# Patient Record
Sex: FEMALE | Race: WHITE
[De-identification: names, ages, dates, MRNs, and addresses within clinical notes are randomized per-mention and may not be internally consistent; named-entity substitution may affect disease eponyms.]

---

## 2018-07-17 ENCOUNTER — HOSPITAL ENCOUNTER (OUTPATIENT)
Dept: HOSPITAL 8 - CARD | Age: 38
Discharge: HOME | End: 2018-07-17
Attending: NURSE PRACTITIONER
Payer: MEDICAID

## 2018-07-17 DIAGNOSIS — G43.711: Primary | ICD-10-CM

## 2018-07-17 DIAGNOSIS — R94.01: ICD-10-CM

## 2018-07-17 PROCEDURE — 95816 EEG AWAKE AND DROWSY: CPT

## 2018-07-17 PROCEDURE — 95819 EEG AWAKE AND ASLEEP: CPT

## 2018-07-31 ENCOUNTER — HOSPITAL ENCOUNTER (OUTPATIENT)
Dept: HOSPITAL 8 - ED | Age: 38
Setting detail: OBSERVATION
LOS: 1 days | Discharge: PSYCHIATRIC OR SUBSTANCE USE FACILITY | End: 2018-08-01
Attending: HOSPITALIST | Admitting: HOSPITALIST
Payer: MEDICAID

## 2018-07-31 VITALS — HEIGHT: 70 IN | BODY MASS INDEX: 27.33 KG/M2 | WEIGHT: 190.92 LBS

## 2018-07-31 VITALS — SYSTOLIC BLOOD PRESSURE: 118 MMHG | DIASTOLIC BLOOD PRESSURE: 78 MMHG

## 2018-07-31 DIAGNOSIS — F06.4: ICD-10-CM

## 2018-07-31 DIAGNOSIS — F32.9: ICD-10-CM

## 2018-07-31 DIAGNOSIS — F17.200: ICD-10-CM

## 2018-07-31 DIAGNOSIS — F41.1: ICD-10-CM

## 2018-07-31 DIAGNOSIS — R45.851: Primary | ICD-10-CM

## 2018-07-31 DIAGNOSIS — G43.909: ICD-10-CM

## 2018-07-31 DIAGNOSIS — I67.1: ICD-10-CM

## 2018-07-31 DIAGNOSIS — G40.909: ICD-10-CM

## 2018-07-31 DIAGNOSIS — Z81.8: ICD-10-CM

## 2018-07-31 LAB
ALBUMIN SERPL-MCNC: 3.7 G/DL (ref 3.4–5)
ALP SERPL-CCNC: 62 U/L (ref 45–117)
ALT SERPL-CCNC: 29 U/L (ref 12–78)
ANION GAP SERPL CALC-SCNC: 9 MMOL/L (ref 5–15)
APAP SERPL-MCNC: < 2 MCG/ML (ref 10–30)
BASOPHILS # BLD AUTO: 0.05 X10^3/UL (ref 0–0.1)
BASOPHILS NFR BLD AUTO: 1 % (ref 0–1)
BILIRUB SERPL-MCNC: 0.3 MG/DL (ref 0.2–1)
CALCIUM SERPL-MCNC: 8.8 MG/DL (ref 8.5–10.1)
CHLORIDE SERPL-SCNC: 105 MMOL/L (ref 98–107)
CREAT SERPL-MCNC: 0.78 MG/DL (ref 0.55–1.02)
CULTURE INDICATED?: YES
EOSINOPHIL # BLD AUTO: 0.22 X10^3/UL (ref 0–0.4)
EOSINOPHIL NFR BLD AUTO: 3 % (ref 1–7)
ERYTHROCYTE [DISTWIDTH] IN BLOOD BY AUTOMATED COUNT: 13.4 % (ref 9.6–15.2)
LYMPHOCYTES # BLD AUTO: 2.26 X10^3/UL (ref 1–3.4)
LYMPHOCYTES NFR BLD AUTO: 26 % (ref 22–44)
MCH RBC QN AUTO: 30.2 PG (ref 27–34.8)
MCHC RBC AUTO-ENTMCNC: 34.6 G/DL (ref 32.4–35.8)
MCV RBC AUTO: 87.1 FL (ref 80–100)
MD: NO
MICROSCOPIC: (no result)
MONOCYTES # BLD AUTO: 0.65 X10^3/UL (ref 0.2–0.8)
MONOCYTES NFR BLD AUTO: 8 % (ref 2–9)
NEUTROPHILS # BLD AUTO: 5.45 X10^3/UL (ref 1.8–6.8)
NEUTROPHILS NFR BLD AUTO: 63 % (ref 42–75)
PLATELET # BLD AUTO: 276 X10^3/UL (ref 130–400)
PMV BLD AUTO: 10 FL (ref 7.4–10.4)
PROT SERPL-MCNC: 7.9 G/DL (ref 6.4–8.2)
RBC # BLD AUTO: 5.37 X10^6/UL (ref 3.82–5.3)
VANCOMYCIN TROUGH SERPL-MCNC: 2.2 MG/DL (ref 2.8–20)

## 2018-07-31 PROCEDURE — 84703 CHORIONIC GONADOTROPIN ASSAY: CPT

## 2018-07-31 PROCEDURE — 80053 COMPREHEN METABOLIC PANEL: CPT

## 2018-07-31 PROCEDURE — 85025 COMPLETE CBC W/AUTO DIFF WBC: CPT

## 2018-07-31 PROCEDURE — 80329 ANALGESICS NON-OPIOID 1 OR 2: CPT

## 2018-07-31 PROCEDURE — 99285 EMERGENCY DEPT VISIT HI MDM: CPT

## 2018-07-31 PROCEDURE — G0480 DRUG TEST DEF 1-7 CLASSES: HCPCS

## 2018-07-31 PROCEDURE — 80164 ASSAY DIPROPYLACETIC ACD TOT: CPT

## 2018-07-31 PROCEDURE — 80307 DRUG TEST PRSMV CHEM ANLYZR: CPT

## 2018-07-31 PROCEDURE — 87086 URINE CULTURE/COLONY COUNT: CPT

## 2018-07-31 PROCEDURE — 81001 URINALYSIS AUTO W/SCOPE: CPT

## 2018-07-31 PROCEDURE — 36415 COLL VENOUS BLD VENIPUNCTURE: CPT

## 2018-07-31 PROCEDURE — 93005 ELECTROCARDIOGRAM TRACING: CPT

## 2018-07-31 PROCEDURE — G0378 HOSPITAL OBSERVATION PER HR: HCPCS

## 2018-07-31 RX ADMIN — DULOXETINE HYDROCHLORIDE SCH MG: 20 CAPSULE, DELAYED RELEASE ORAL at 22:19

## 2018-07-31 RX ADMIN — PROPRANOLOL HYDROCHLORIDE SCH MG: 20 TABLET ORAL at 22:18

## 2018-08-01 VITALS — SYSTOLIC BLOOD PRESSURE: 101 MMHG | DIASTOLIC BLOOD PRESSURE: 72 MMHG

## 2018-08-01 RX ADMIN — DULOXETINE HYDROCHLORIDE SCH MG: 20 CAPSULE, DELAYED RELEASE ORAL at 08:12

## 2018-08-01 RX ADMIN — PROPRANOLOL HYDROCHLORIDE SCH MG: 20 TABLET ORAL at 08:09

## 2018-09-06 ENCOUNTER — HOSPITAL ENCOUNTER (EMERGENCY)
Dept: HOSPITAL 8 - ED | Age: 38
Discharge: HOME | End: 2018-09-06
Payer: MEDICAID

## 2018-09-06 VITALS — DIASTOLIC BLOOD PRESSURE: 52 MMHG | SYSTOLIC BLOOD PRESSURE: 96 MMHG

## 2018-09-06 VITALS — HEIGHT: 70 IN | WEIGHT: 189.4 LBS | BODY MASS INDEX: 27.11 KG/M2

## 2018-09-06 DIAGNOSIS — G43.909: ICD-10-CM

## 2018-09-06 DIAGNOSIS — R10.31: ICD-10-CM

## 2018-09-06 DIAGNOSIS — R10.11: Primary | ICD-10-CM

## 2018-09-06 LAB
ALBUMIN SERPL-MCNC: 3.3 G/DL (ref 3.4–5)
ALP SERPL-CCNC: 46 U/L (ref 45–117)
ALT SERPL-CCNC: 19 U/L (ref 12–78)
ANION GAP SERPL CALC-SCNC: 5 MMOL/L (ref 5–15)
APTT BLD: 31 SECONDS (ref 25–31)
BASOPHILS # BLD AUTO: 0.07 X10^3/UL (ref 0–0.1)
BASOPHILS NFR BLD AUTO: 1 % (ref 0–1)
BILIRUB SERPL-MCNC: 0.3 MG/DL (ref 0.2–1)
CALCIUM SERPL-MCNC: 8.6 MG/DL (ref 8.5–10.1)
CHLORIDE SERPL-SCNC: 106 MMOL/L (ref 98–107)
CREAT SERPL-MCNC: 0.73 MG/DL (ref 0.55–1.02)
CULTURE INDICATED?: YES
EOSINOPHIL # BLD AUTO: 0.22 X10^3/UL (ref 0–0.4)
EOSINOPHIL NFR BLD AUTO: 3 % (ref 1–7)
ERYTHROCYTE [DISTWIDTH] IN BLOOD BY AUTOMATED COUNT: 13 % (ref 9.6–15.2)
INR PPP: 1.03 (ref 0.93–1.1)
LYMPHOCYTES # BLD AUTO: 2.54 X10^3/UL (ref 1–3.4)
LYMPHOCYTES NFR BLD AUTO: 34 % (ref 22–44)
MCH RBC QN AUTO: 30.1 PG (ref 27–34.8)
MCHC RBC AUTO-ENTMCNC: 34.4 G/DL (ref 32.4–35.8)
MCV RBC AUTO: 87.6 FL (ref 80–100)
MD: NO
MICROSCOPIC: (no result)
MONOCYTES # BLD AUTO: 0.83 X10^3/UL (ref 0.2–0.8)
MONOCYTES NFR BLD AUTO: 11 % (ref 2–9)
NEUTROPHILS # BLD AUTO: 3.8 X10^3/UL (ref 1.8–6.8)
NEUTROPHILS NFR BLD AUTO: 51 % (ref 42–75)
PLATELET # BLD AUTO: 245 X10^3/UL (ref 130–400)
PMV BLD AUTO: 9.3 FL (ref 7.4–10.4)
PROT SERPL-MCNC: 6.8 G/DL (ref 6.4–8.2)
PROTHROMBIN TIME: 10.7 SECONDS (ref 9.6–11.5)
RBC # BLD AUTO: 4.5 X10^6/UL (ref 3.82–5.3)

## 2018-09-06 PROCEDURE — 96374 THER/PROPH/DIAG INJ IV PUSH: CPT

## 2018-09-06 PROCEDURE — 74177 CT ABD & PELVIS W/CONTRAST: CPT

## 2018-09-06 PROCEDURE — 84703 CHORIONIC GONADOTROPIN ASSAY: CPT

## 2018-09-06 PROCEDURE — 85025 COMPLETE CBC W/AUTO DIFF WBC: CPT

## 2018-09-06 PROCEDURE — 81001 URINALYSIS AUTO W/SCOPE: CPT

## 2018-09-06 PROCEDURE — 80053 COMPREHEN METABOLIC PANEL: CPT

## 2018-09-06 PROCEDURE — 99285 EMERGENCY DEPT VISIT HI MDM: CPT

## 2018-09-06 PROCEDURE — 87086 URINE CULTURE/COLONY COUNT: CPT

## 2018-09-06 PROCEDURE — 85730 THROMBOPLASTIN TIME PARTIAL: CPT

## 2018-09-06 PROCEDURE — 36415 COLL VENOUS BLD VENIPUNCTURE: CPT

## 2018-09-06 PROCEDURE — 85610 PROTHROMBIN TIME: CPT

## 2018-09-06 PROCEDURE — 83690 ASSAY OF LIPASE: CPT

## 2019-06-28 ENCOUNTER — HOSPITAL ENCOUNTER (EMERGENCY)
Dept: HOSPITAL 8 - ED | Age: 39
Discharge: HOME | End: 2019-06-28
Payer: MEDICAID

## 2019-06-28 VITALS — WEIGHT: 247.8 LBS | BODY MASS INDEX: 34.69 KG/M2 | HEIGHT: 71 IN

## 2019-06-28 VITALS — SYSTOLIC BLOOD PRESSURE: 129 MMHG | DIASTOLIC BLOOD PRESSURE: 81 MMHG

## 2019-06-28 DIAGNOSIS — L50.9: Primary | ICD-10-CM

## 2019-06-28 PROCEDURE — 99283 EMERGENCY DEPT VISIT LOW MDM: CPT

## 2019-07-04 ENCOUNTER — HOSPITAL ENCOUNTER (EMERGENCY)
Dept: HOSPITAL 8 - ED | Age: 39
Discharge: HOME | End: 2019-07-04
Payer: MEDICAID

## 2019-07-04 VITALS — WEIGHT: 242.51 LBS | BODY MASS INDEX: 33.95 KG/M2 | HEIGHT: 71 IN

## 2019-07-04 VITALS — DIASTOLIC BLOOD PRESSURE: 107 MMHG | SYSTOLIC BLOOD PRESSURE: 178 MMHG

## 2019-07-04 DIAGNOSIS — F17.200: ICD-10-CM

## 2019-07-04 DIAGNOSIS — R42: Primary | ICD-10-CM

## 2019-07-04 DIAGNOSIS — R11.2: ICD-10-CM

## 2019-07-04 LAB
ALBUMIN SERPL-MCNC: 3.3 G/DL (ref 3.4–5)
ANION GAP SERPL CALC-SCNC: 9 MMOL/L (ref 5–15)
BASOPHILS # BLD AUTO: 0.02 X10^3/UL (ref 0–0.1)
BASOPHILS NFR BLD AUTO: 0 % (ref 0–1)
CALCIUM SERPL-MCNC: 8.6 MG/DL (ref 8.5–10.1)
CHLORIDE SERPL-SCNC: 103 MMOL/L (ref 98–107)
CREAT SERPL-MCNC: 1.01 MG/DL (ref 0.55–1.02)
EOSINOPHIL # BLD AUTO: 0.19 X10^3/UL (ref 0–0.4)
EOSINOPHIL NFR BLD AUTO: 2 % (ref 1–7)
ERYTHROCYTE [DISTWIDTH] IN BLOOD BY AUTOMATED COUNT: 14.3 % (ref 9.6–15.2)
LYMPHOCYTES # BLD AUTO: 0.8 X10^3/UL (ref 1–3.4)
LYMPHOCYTES NFR BLD AUTO: 7 % (ref 22–44)
MCH RBC QN AUTO: 29.3 PG (ref 27–34.8)
MCHC RBC AUTO-ENTMCNC: 33.4 G/DL (ref 32.4–35.8)
MCV RBC AUTO: 87.8 FL (ref 80–100)
MD: (no result)
MONOCYTES # BLD AUTO: 0.68 X10^3/UL (ref 0.2–0.8)
MONOCYTES NFR BLD AUTO: 6 % (ref 2–9)
NEUTROPHILS # BLD AUTO: 9.96 X10^3/UL (ref 1.8–6.8)
NEUTROPHILS NFR BLD AUTO: 86 % (ref 42–75)
PLATELET # BLD AUTO: 270 X10^3/UL (ref 130–400)
PMV BLD AUTO: 9.4 FL (ref 7.4–10.4)
RBC # BLD AUTO: 5.08 X10^6/UL (ref 3.82–5.3)

## 2019-07-04 PROCEDURE — 99284 EMERGENCY DEPT VISIT MOD MDM: CPT

## 2019-07-04 PROCEDURE — 80048 BASIC METABOLIC PNL TOTAL CA: CPT

## 2019-07-04 PROCEDURE — 85025 COMPLETE CBC W/AUTO DIFF WBC: CPT

## 2019-07-04 PROCEDURE — 36415 COLL VENOUS BLD VENIPUNCTURE: CPT

## 2019-07-04 PROCEDURE — 93005 ELECTROCARDIOGRAM TRACING: CPT

## 2019-07-04 PROCEDURE — 82040 ASSAY OF SERUM ALBUMIN: CPT

## 2019-07-04 NOTE — NUR
PT GIVEN DC INSTRUCTIONS AND SCRIPT. PT EDUCATED REGARDING DC MEDICATION, PT'S 
AOX4. RESPS EVEN AND UNLABORED. NO ACUTE DISTRESS AT DC. PT AMB TO DC WITH 
STEADY GAIT.

## 2019-07-04 NOTE — NUR
FIRST CONTACT WITH PT. PT STATES "I WAS DRIVING, MY CAR DOESNT HAVE 
AIRCONDITIONING.  HAS BEEN DIZZY FOR 2 HOURS, COMES AND GOES, HASNT BEEN THIS 
BAD.   I CANT KEEP FLUIDS DOWN"  PT C/O N/V/D AS WELL. PT BEGAN SHAKING AND 
CALLING OUT, SLID DOWN CHAIR IN TRIAGE

CURRENTLY DENIES SEIZURE HX

PT'S AOX4. RESPS EVEN AND UNLABORED. ALL MONITORS IN PLACE. CALL LIGHT WITHIN 
REACH.

## 2020-05-28 ENCOUNTER — HOSPITAL ENCOUNTER (EMERGENCY)
Dept: HOSPITAL 8 - ED | Age: 40
Discharge: HOME | End: 2020-05-28
Payer: MEDICAID

## 2020-05-28 VITALS — BODY MASS INDEX: 33.55 KG/M2 | WEIGHT: 239.64 LBS | HEIGHT: 71 IN

## 2020-05-28 VITALS — SYSTOLIC BLOOD PRESSURE: 112 MMHG | DIASTOLIC BLOOD PRESSURE: 69 MMHG

## 2020-05-28 DIAGNOSIS — R06.02: ICD-10-CM

## 2020-05-28 DIAGNOSIS — Z87.891: ICD-10-CM

## 2020-05-28 DIAGNOSIS — K21.9: ICD-10-CM

## 2020-05-28 DIAGNOSIS — R07.89: Primary | ICD-10-CM

## 2020-05-28 DIAGNOSIS — J45.909: ICD-10-CM

## 2020-05-28 DIAGNOSIS — R94.31: ICD-10-CM

## 2020-05-28 LAB
ALBUMIN SERPL-MCNC: 3.5 G/DL (ref 3.4–5)
ANION GAP SERPL CALC-SCNC: 9 MMOL/L (ref 5–15)
BASOPHILS # BLD AUTO: 0.09 X10^3/UL (ref 0–0.1)
BASOPHILS NFR BLD AUTO: 1 % (ref 0–1)
CALCIUM SERPL-MCNC: 8.7 MG/DL (ref 8.5–10.1)
CHLORIDE SERPL-SCNC: 107 MMOL/L (ref 98–107)
CREAT SERPL-MCNC: 0.88 MG/DL (ref 0.55–1.02)
EOSINOPHIL # BLD AUTO: 0.18 X10^3/UL (ref 0–0.4)
EOSINOPHIL NFR BLD AUTO: 3 % (ref 1–7)
ERYTHROCYTE [DISTWIDTH] IN BLOOD BY AUTOMATED COUNT: 13.6 % (ref 9.6–15.2)
LYMPHOCYTES # BLD AUTO: 2.1 X10^3/UL (ref 1–3.4)
LYMPHOCYTES NFR BLD AUTO: 30 % (ref 22–44)
MCH RBC QN AUTO: 29.7 PG (ref 27–34.8)
MCHC RBC AUTO-ENTMCNC: 33.2 G/DL (ref 32.4–35.8)
MCV RBC AUTO: 89.4 FL (ref 80–100)
MD: NO
MONOCYTES # BLD AUTO: 0.8 X10^3/UL (ref 0.2–0.8)
MONOCYTES NFR BLD AUTO: 11 % (ref 2–9)
NEUTROPHILS # BLD AUTO: 3.95 X10^3/UL (ref 1.8–6.8)
NEUTROPHILS NFR BLD AUTO: 56 % (ref 42–75)
PLATELET # BLD AUTO: 287 X10^3/UL (ref 130–400)
PMV BLD AUTO: 9.4 FL (ref 7.4–10.4)
RBC # BLD AUTO: 4.81 X10^6/UL (ref 3.82–5.3)
TROPONIN I SERPL-MCNC: < 0.015 NG/ML (ref 0–0.04)

## 2020-05-28 PROCEDURE — 93005 ELECTROCARDIOGRAM TRACING: CPT

## 2020-05-28 PROCEDURE — 36415 COLL VENOUS BLD VENIPUNCTURE: CPT

## 2020-05-28 PROCEDURE — 85025 COMPLETE CBC W/AUTO DIFF WBC: CPT

## 2020-05-28 PROCEDURE — 82040 ASSAY OF SERUM ALBUMIN: CPT

## 2020-05-28 PROCEDURE — 84484 ASSAY OF TROPONIN QUANT: CPT

## 2020-05-28 PROCEDURE — 71046 X-RAY EXAM CHEST 2 VIEWS: CPT

## 2020-05-28 PROCEDURE — 99285 EMERGENCY DEPT VISIT HI MDM: CPT

## 2020-05-28 PROCEDURE — 80048 BASIC METABOLIC PNL TOTAL CA: CPT

## 2020-05-28 NOTE — NUR
PT PRESENTED TO ED WITH COMPLAINTS OF CHEST PAIN AND SOB. PT STATES HAVE HAD 
HIVES ON HER "LEGS" FOR A WEEK NOW AND HAVE BEEN TAKING BENADRYL AND PEPCID. PT 
STATES CP OCCURS AT NIGHT TIME WHILE LYING DOWN AND "USUALLY GOES AWAY WHEN I 
WAKE UP BUT TODAY IT DIDNT GO AWAY." ERMD AT BEDSIDE EVALUATING PT.

## 2020-05-28 NOTE — NUR
PT DC IN A STABLE CONDITION. DC INSTRUCTIONS WERE DISCUSSED WITH PT. PT 
VERBALIZED UNDERSTANDING. NO FURTHER QUESTIONS OR CONCERNS WERE EXPRESSED AT 
THAT TIME. PT AMBULATED TO DC DESK WITH RN WITH A STEADY GAIT.

## 2020-12-11 ENCOUNTER — HOSPITAL ENCOUNTER (EMERGENCY)
Dept: HOSPITAL 8 - ED | Age: 40
Discharge: LEFT BEFORE DISCHARGE | End: 2020-12-11
Payer: MEDICAID

## 2020-12-11 VITALS — BODY MASS INDEX: 33.36 KG/M2 | WEIGHT: 238.32 LBS | HEIGHT: 71 IN

## 2020-12-11 VITALS — DIASTOLIC BLOOD PRESSURE: 87 MMHG | SYSTOLIC BLOOD PRESSURE: 127 MMHG

## 2020-12-11 DIAGNOSIS — N93.9: Primary | ICD-10-CM

## 2020-12-11 DIAGNOSIS — Z53.21: ICD-10-CM

## 2020-12-11 LAB
ALBUMIN SERPL-MCNC: 3.8 G/DL (ref 3.4–5)
ALP SERPL-CCNC: 83 U/L (ref 45–117)
ALT SERPL-CCNC: 35 U/L (ref 12–78)
ANION GAP SERPL CALC-SCNC: 7 MMOL/L (ref 5–15)
BASOPHILS # BLD AUTO: 0.1 X10^3/UL (ref 0–0.1)
BASOPHILS NFR BLD AUTO: 1 % (ref 0–1)
BILIRUB SERPL-MCNC: 0.5 MG/DL (ref 0.2–1)
CALCIUM SERPL-MCNC: 8.9 MG/DL (ref 8.5–10.1)
CHLORIDE SERPL-SCNC: 105 MMOL/L (ref 98–107)
CREAT SERPL-MCNC: 0.83 MG/DL (ref 0.55–1.02)
EOSINOPHIL # BLD AUTO: 0.1 X10^3/UL (ref 0–0.4)
EOSINOPHIL NFR BLD AUTO: 2 % (ref 1–7)
ERYTHROCYTE [DISTWIDTH] IN BLOOD BY AUTOMATED COUNT: 13.8 % (ref 9.6–15.2)
LYMPHOCYTES # BLD AUTO: 2 X10^3/UL (ref 1–3.4)
LYMPHOCYTES NFR BLD AUTO: 22 % (ref 22–44)
MCH RBC QN AUTO: 29.8 PG (ref 27–34.8)
MCHC RBC AUTO-ENTMCNC: 33.7 G/DL (ref 32.4–35.8)
MD: NO
MONOCYTES # BLD AUTO: 0.8 X10^3/UL (ref 0.2–0.8)
MONOCYTES NFR BLD AUTO: 9 % (ref 2–9)
NEUTROPHILS # BLD AUTO: 5.9 X10^3/UL (ref 1.8–6.8)
NEUTROPHILS NFR BLD AUTO: 66 % (ref 42–75)
PLATELET # BLD AUTO: 354 X10^3/UL (ref 130–400)
PMV BLD AUTO: 9 FL (ref 7.4–10.4)
PROT SERPL-MCNC: 8.4 G/DL (ref 6.4–8.2)
RBC # BLD AUTO: 5.14 X10^6/UL (ref 3.82–5.3)

## 2020-12-11 PROCEDURE — 80053 COMPREHEN METABOLIC PANEL: CPT

## 2020-12-11 PROCEDURE — 85025 COMPLETE CBC W/AUTO DIFF WBC: CPT

## 2020-12-11 PROCEDURE — 36415 COLL VENOUS BLD VENIPUNCTURE: CPT

## 2020-12-11 PROCEDURE — 84702 CHORIONIC GONADOTROPIN TEST: CPT

## 2023-05-01 ENCOUNTER — NON-PROVIDER VISIT (OUTPATIENT)
Dept: OCCUPATIONAL MEDICINE | Facility: CLINIC | Age: 43
End: 2023-05-01

## 2023-05-01 DIAGNOSIS — Z02.1 PRE-EMPLOYMENT HEALTH SCREENING EXAMINATION: ICD-10-CM

## 2023-05-01 DIAGNOSIS — Z02.1 PRE-EMPLOYMENT DRUG SCREENING: Primary | ICD-10-CM

## 2023-05-01 LAB
AMP AMPHETAMINE: NORMAL
COC COCAINE: NORMAL
INT CON NEG: NORMAL
INT CON POS: NORMAL
MET METHAMPHETAMINES: NORMAL
OPI OPIATES: NORMAL
PCP PHENCYCLIDINE: NORMAL
POC DRUG COMMENT 753798-OCCUPATIONAL HEALTH: NEGATIVE
THC: NORMAL

## 2023-05-01 PROCEDURE — 80305 DRUG TEST PRSMV DIR OPT OBS: CPT | Performed by: NURSE PRACTITIONER

## 2023-05-01 PROCEDURE — 86580 TB INTRADERMAL TEST: CPT | Performed by: NURSE PRACTITIONER

## 2023-05-03 ENCOUNTER — NON-PROVIDER VISIT (OUTPATIENT)
Dept: OCCUPATIONAL MEDICINE | Facility: CLINIC | Age: 43
End: 2023-05-03

## 2023-05-03 DIAGNOSIS — Z11.1 ENCOUNTER FOR PPD SKIN TEST READING: ICD-10-CM

## 2023-05-04 LAB — TB WHEAL 3D P 5 TU DIAM: NORMAL MM

## 2024-02-28 PROBLEM — S82.841A BIMALLEOLAR ANKLE FRACTURE, RIGHT, CLOSED, INITIAL ENCOUNTER: Status: ACTIVE | Noted: 2024-02-28

## 2024-02-28 RX ORDER — BUSPIRONE HYDROCHLORIDE 10 MG/1
10 TABLET ORAL 2 TIMES DAILY
COMMUNITY

## 2024-02-28 RX ORDER — METRONIDAZOLE
POWDER (GRAM) MISCELLANEOUS
Status: ON HOLD | COMMUNITY
End: 2024-03-05

## 2024-02-28 RX ORDER — HYDROXYZINE HCL
POWDER (GRAM) MISCELLANEOUS
Status: ON HOLD | COMMUNITY
End: 2024-03-05

## 2024-02-28 NOTE — H&P (VIEW-ONLY)
Subjective   Patient ID:  Yani Moore is a 43 y.o. female.    Chief Complaint:  Fracture of the Right Ankle    Last Surgery: No surgery found on No surgery found    HPI Yani is a pleasant 43-year-old female who had a syncopal episode last night and fell sustaining an injury to her right ankle.  She had a syncope workup at the ER that was negative.  She is having pain medially and laterally around her right ankle.  Denies numbness or tingling.    Review of Systems Works as a caregiver professionally.  Otherwise negative.    Objective   Ortho Exam  Alert and oriented no apparent distress.  Very pleasant conversation.  Chest breathing comfortably, heart regular rate and rhythm.  She has moderate to severe circumferential swelling around her right ankle.  Her skin is intact.  She is neurovascularly intact.    Last Imaging Result(s):   DX-ANKLE 3+ VIEWS RIGHT  New x-rays taken today AP lateral and mortise view of the right ankle show   a distal fibula fracture, medial clear space widening with avulsion of her   deltoid.  Likely syndesmotic disruption.      Assessment & Plan   Encounter Diagnoses:   Bimalleolar ankle fracture, right, closed, initial encounter    Orders Placed This Encounter    Surgical Case Request: ORIF, ANKLE     Yani is a pleasant 43-year-old female with a bimalleolar equivalent right ankle fracture, syndesmotic disruption, deltoid avulsion.  I would like to splint her for soft tissue rest.  She is indicated for surgical stabilization.  She will be weightbearing as tolerated in a boot postoperatively.  I like to get her into physical therapy a week postoperatively.  We discussed the procedure and the expected postoperative course.  I have filled out a scheduling form and look forward to seeing her on her scheduled date.  Return for Post-Op, Imaging.

## 2024-02-29 ENCOUNTER — APPOINTMENT (OUTPATIENT)
Dept: ADMISSIONS | Facility: MEDICAL CENTER | Age: 44
End: 2024-02-29
Attending: ORTHOPAEDIC SURGERY
Payer: MEDICAID

## 2024-03-01 ENCOUNTER — PRE-ADMISSION TESTING (OUTPATIENT)
Dept: ADMISSIONS | Facility: MEDICAL CENTER | Age: 44
End: 2024-03-01
Payer: MEDICAID

## 2024-03-01 RX ORDER — CARIPRAZINE 4.5 MG/1
4.5 CAPSULE, GELATIN COATED ORAL NIGHTLY
COMMUNITY
Start: 2024-02-16

## 2024-03-01 RX ORDER — DIVALPROEX SODIUM 250 MG/1
250 TABLET, DELAYED RELEASE ORAL EVERY MORNING
COMMUNITY
Start: 2024-02-21

## 2024-03-01 RX ORDER — KETOROLAC TROMETHAMINE 10 MG/1
10 TABLET, FILM COATED ORAL EVERY 6 HOURS PRN
COMMUNITY

## 2024-03-01 RX ORDER — HYDROCODONE BITARTRATE AND ACETAMINOPHEN 5; 325 MG/1; MG/1
1 TABLET ORAL EVERY 4 HOURS PRN
COMMUNITY
Start: 2024-02-28

## 2024-03-01 RX ORDER — DIVALPROEX SODIUM 500 MG/1
500 TABLET, DELAYED RELEASE ORAL NIGHTLY
COMMUNITY

## 2024-03-04 ENCOUNTER — ANESTHESIA EVENT (OUTPATIENT)
Dept: SURGERY | Facility: MEDICAL CENTER | Age: 44
End: 2024-03-04
Payer: MEDICAID

## 2024-03-05 ENCOUNTER — HOSPITAL ENCOUNTER (OUTPATIENT)
Facility: MEDICAL CENTER | Age: 44
End: 2024-03-05
Attending: ORTHOPAEDIC SURGERY | Admitting: ORTHOPAEDIC SURGERY
Payer: MEDICAID

## 2024-03-05 ENCOUNTER — APPOINTMENT (OUTPATIENT)
Dept: RADIOLOGY | Facility: MEDICAL CENTER | Age: 44
End: 2024-03-05
Attending: ORTHOPAEDIC SURGERY
Payer: MEDICAID

## 2024-03-05 ENCOUNTER — ANESTHESIA (OUTPATIENT)
Dept: SURGERY | Facility: MEDICAL CENTER | Age: 44
End: 2024-03-05
Payer: MEDICAID

## 2024-03-05 VITALS
TEMPERATURE: 97.4 F | SYSTOLIC BLOOD PRESSURE: 141 MMHG | OXYGEN SATURATION: 94 % | HEART RATE: 74 BPM | RESPIRATION RATE: 18 BRPM | HEIGHT: 70 IN | DIASTOLIC BLOOD PRESSURE: 86 MMHG | WEIGHT: 231.48 LBS | BODY MASS INDEX: 33.14 KG/M2

## 2024-03-05 LAB
ANION GAP SERPL CALC-SCNC: 17 MMOL/L (ref 7–16)
BUN SERPL-MCNC: 12 MG/DL (ref 8–22)
CALCIUM SERPL-MCNC: 9.5 MG/DL (ref 8.5–10.5)
CHLORIDE SERPL-SCNC: 104 MMOL/L (ref 96–112)
CO2 SERPL-SCNC: 17 MMOL/L (ref 20–33)
CREAT SERPL-MCNC: 0.83 MG/DL (ref 0.5–1.4)
ERYTHROCYTE [DISTWIDTH] IN BLOOD BY AUTOMATED COUNT: 41.1 FL (ref 35.9–50)
GFR SERPLBLD CREATININE-BSD FMLA CKD-EPI: 88 ML/MIN/1.73 M 2
GLUCOSE SERPL-MCNC: 107 MG/DL (ref 65–99)
HCG UR QL: NEGATIVE
HCT VFR BLD AUTO: 46.2 % (ref 37–47)
HGB BLD-MCNC: 15.9 G/DL (ref 12–16)
MCH RBC QN AUTO: 28.9 PG (ref 27–33)
MCHC RBC AUTO-ENTMCNC: 34.4 G/DL (ref 32.2–35.5)
MCV RBC AUTO: 84 FL (ref 81.4–97.8)
PLATELET # BLD AUTO: 405 K/UL (ref 164–446)
PMV BLD AUTO: 11.2 FL (ref 9–12.9)
POTASSIUM SERPL-SCNC: 3.6 MMOL/L (ref 3.6–5.5)
RBC # BLD AUTO: 5.5 M/UL (ref 4.2–5.4)
SODIUM SERPL-SCNC: 138 MMOL/L (ref 135–145)
WBC # BLD AUTO: 9 K/UL (ref 4.8–10.8)

## 2024-03-05 PROCEDURE — 160036 HCHG PACU - EA ADDL 30 MINS PHASE I: Performed by: ORTHOPAEDIC SURGERY

## 2024-03-05 PROCEDURE — 160002 HCHG RECOVERY MINUTES (STAT): Performed by: ORTHOPAEDIC SURGERY

## 2024-03-05 PROCEDURE — 85027 COMPLETE CBC AUTOMATED: CPT

## 2024-03-05 PROCEDURE — 80048 BASIC METABOLIC PNL TOTAL CA: CPT

## 2024-03-05 PROCEDURE — 160035 HCHG PACU - 1ST 60 MINS PHASE I: Performed by: ORTHOPAEDIC SURGERY

## 2024-03-05 PROCEDURE — 160048 HCHG OR STATISTICAL LEVEL 1-5: Performed by: ORTHOPAEDIC SURGERY

## 2024-03-05 PROCEDURE — 27814 TREATMENT OF ANKLE FRACTURE: CPT | Mod: ASROC,RT | Performed by: NURSE PRACTITIONER

## 2024-03-05 PROCEDURE — 73610 X-RAY EXAM OF ANKLE: CPT | Mod: RT

## 2024-03-05 PROCEDURE — 160046 HCHG PACU - 1ST 60 MINS PHASE II: Performed by: ORTHOPAEDIC SURGERY

## 2024-03-05 PROCEDURE — 700111 HCHG RX REV CODE 636 W/ 250 OVERRIDE (IP): Mod: UD | Performed by: ORTHOPAEDIC SURGERY

## 2024-03-05 PROCEDURE — 160009 HCHG ANES TIME/MIN: Performed by: ORTHOPAEDIC SURGERY

## 2024-03-05 PROCEDURE — 81025 URINE PREGNANCY TEST: CPT

## 2024-03-05 PROCEDURE — 27814 TREATMENT OF ANKLE FRACTURE: CPT | Mod: RT | Performed by: ORTHOPAEDIC SURGERY

## 2024-03-05 PROCEDURE — 36415 COLL VENOUS BLD VENIPUNCTURE: CPT

## 2024-03-05 PROCEDURE — 160029 HCHG SURGERY MINUTES - 1ST 30 MINS LEVEL 4: Performed by: ORTHOPAEDIC SURGERY

## 2024-03-05 PROCEDURE — 27829 TREAT LOWER LEG JOINT: CPT | Mod: ASROC,RT | Performed by: NURSE PRACTITIONER

## 2024-03-05 PROCEDURE — 64445 NJX AA&/STRD SCIATIC NRV IMG: CPT | Performed by: ORTHOPAEDIC SURGERY

## 2024-03-05 PROCEDURE — 8968 PR NO CHARGE - PROCEDURE: Mod: ASROC,RT | Performed by: NURSE PRACTITIONER

## 2024-03-05 PROCEDURE — 160025 RECOVERY II MINUTES (STATS): Performed by: ORTHOPAEDIC SURGERY

## 2024-03-05 PROCEDURE — 700111 HCHG RX REV CODE 636 W/ 250 OVERRIDE (IP): Mod: UD | Performed by: STUDENT IN AN ORGANIZED HEALTH CARE EDUCATION/TRAINING PROGRAM

## 2024-03-05 PROCEDURE — 160041 HCHG SURGERY MINUTES - EA ADDL 1 MIN LEVEL 4: Performed by: ORTHOPAEDIC SURGERY

## 2024-03-05 PROCEDURE — 27829 TREAT LOWER LEG JOINT: CPT | Mod: RT | Performed by: ORTHOPAEDIC SURGERY

## 2024-03-05 PROCEDURE — 700105 HCHG RX REV CODE 258: Mod: UD | Performed by: ORTHOPAEDIC SURGERY

## 2024-03-05 PROCEDURE — 27696 REPAIR OF ANKLE LIGAMENTS: CPT | Mod: ASROC,RT | Performed by: NURSE PRACTITIONER

## 2024-03-05 PROCEDURE — C1713 ANCHOR/SCREW BN/BN,TIS/BN: HCPCS | Performed by: ORTHOPAEDIC SURGERY

## 2024-03-05 PROCEDURE — 700101 HCHG RX REV CODE 250: Mod: UD | Performed by: STUDENT IN AN ORGANIZED HEALTH CARE EDUCATION/TRAINING PROGRAM

## 2024-03-05 PROCEDURE — 27695 REPAIR OF ANKLE LIGAMENT: CPT | Mod: RT | Performed by: ORTHOPAEDIC SURGERY

## 2024-03-05 PROCEDURE — 27696 REPAIR OF ANKLE LIGAMENTS: CPT | Mod: RT | Performed by: ORTHOPAEDIC SURGERY

## 2024-03-05 DEVICE — SCREW BONE VARIAX T10 FULL THREAD L18 MM OD3.5 MM FOOT ANKLE STARDRIVE NONSTERILE: Type: IMPLANTABLE DEVICE | Site: ANKLE | Status: FUNCTIONAL

## 2024-03-05 DEVICE — PLATE BONE VARIAX TITANIUM 77 MM X 10 MM X 2 MM 16 MM X 1.3 MM FIBULA LATERAL 3 HOLE POLYAXIAL LOCK: Type: IMPLANTABLE DEVICE | Site: ANKLE | Status: FUNCTIONAL

## 2024-03-05 DEVICE — SCREW BONE VARIAX T10 FULL THREAD L12 MM OD3.5 MM FOOT ANKLE LOCK STARDRIVE NONSTERILE: Type: IMPLANTABLE DEVICE | Site: ANKLE | Status: FUNCTIONAL

## 2024-03-05 DEVICE — SCREW BONE VARIAX T10 FULL THREAD L10 MM OD3.5 MM FOOT ANKLE LOCK STARDRIVE NONSTERILE: Type: IMPLANTABLE DEVICE | Site: ANKLE | Status: FUNCTIONAL

## 2024-03-05 DEVICE — SCREW BONE VARIAX T10 FULL THREAD L16 MM OD3.5 MM FOOT ANKLE STARDRIVE NONSTERILE: Type: IMPLANTABLE DEVICE | Site: ANKLE | Status: FUNCTIONAL

## 2024-03-05 DEVICE — DEVICE FIXATION GRAVITY SYNCHFIX SYNDESMOSIS (1EA): Type: IMPLANTABLE DEVICE | Site: ANKLE | Status: FUNCTIONAL

## 2024-03-05 DEVICE — SCREW BONE VARIAX T10 FULL THREAD L14 MM OD3.5 MM FOOT ANKLE STARDRIVE NONSTERILE: Type: IMPLANTABLE DEVICE | Site: ANKLE | Status: FUNCTIONAL

## 2024-03-05 DEVICE — SCREW BONE VARIAX T10 FULL THREAD L12 MM OD3.5 MM FOOT ANKLE STARDRIVE NONSTERILE: Type: IMPLANTABLE DEVICE | Site: ANKLE | Status: FUNCTIONAL

## 2024-03-05 RX ORDER — OXYCODONE HCL 5 MG/5 ML
5 SOLUTION, ORAL ORAL
Status: DISCONTINUED | OUTPATIENT
Start: 2024-03-05 | End: 2024-03-05 | Stop reason: HOSPADM

## 2024-03-05 RX ORDER — DIPHENHYDRAMINE HYDROCHLORIDE 50 MG/ML
12.5 INJECTION INTRAMUSCULAR; INTRAVENOUS
Status: DISCONTINUED | OUTPATIENT
Start: 2024-03-05 | End: 2024-03-05 | Stop reason: HOSPADM

## 2024-03-05 RX ORDER — ACETAMINOPHEN 500 MG
1000 TABLET ORAL ONCE
Status: DISCONTINUED | OUTPATIENT
Start: 2024-03-05 | End: 2024-03-05 | Stop reason: HOSPADM

## 2024-03-05 RX ORDER — DEXAMETHASONE SODIUM PHOSPHATE 4 MG/ML
INJECTION, SOLUTION INTRA-ARTICULAR; INTRALESIONAL; INTRAMUSCULAR; INTRAVENOUS; SOFT TISSUE PRN
Status: DISCONTINUED | OUTPATIENT
Start: 2024-03-05 | End: 2024-03-05 | Stop reason: SURG

## 2024-03-05 RX ORDER — MIDAZOLAM HYDROCHLORIDE 1 MG/ML
INJECTION INTRAMUSCULAR; INTRAVENOUS PRN
Status: DISCONTINUED | OUTPATIENT
Start: 2024-03-05 | End: 2024-03-05 | Stop reason: SURG

## 2024-03-05 RX ORDER — ROPIVACAINE HYDROCHLORIDE 5 MG/ML
INJECTION, SOLUTION EPIDURAL; INFILTRATION; PERINEURAL
Status: COMPLETED | OUTPATIENT
Start: 2024-03-05 | End: 2024-03-05

## 2024-03-05 RX ORDER — LIDOCAINE HYDROCHLORIDE 20 MG/ML
INJECTION, SOLUTION EPIDURAL; INFILTRATION; INTRACAUDAL; PERINEURAL PRN
Status: DISCONTINUED | OUTPATIENT
Start: 2024-03-05 | End: 2024-03-05 | Stop reason: SURG

## 2024-03-05 RX ORDER — ONDANSETRON 2 MG/ML
4 INJECTION INTRAMUSCULAR; INTRAVENOUS
Status: DISCONTINUED | OUTPATIENT
Start: 2024-03-05 | End: 2024-03-05 | Stop reason: HOSPADM

## 2024-03-05 RX ORDER — OXYCODONE HCL 5 MG/5 ML
10 SOLUTION, ORAL ORAL
Status: DISCONTINUED | OUTPATIENT
Start: 2024-03-05 | End: 2024-03-05 | Stop reason: HOSPADM

## 2024-03-05 RX ORDER — CEFAZOLIN SODIUM 1 G/3ML
2 INJECTION, POWDER, FOR SOLUTION INTRAMUSCULAR; INTRAVENOUS ONCE
Status: COMPLETED | OUTPATIENT
Start: 2024-03-05 | End: 2024-03-05

## 2024-03-05 RX ORDER — HYDROMORPHONE HYDROCHLORIDE 1 MG/ML
0.1 INJECTION, SOLUTION INTRAMUSCULAR; INTRAVENOUS; SUBCUTANEOUS
Status: DISCONTINUED | OUTPATIENT
Start: 2024-03-05 | End: 2024-03-05 | Stop reason: HOSPADM

## 2024-03-05 RX ORDER — KETOROLAC TROMETHAMINE 15 MG/ML
INJECTION, SOLUTION INTRAMUSCULAR; INTRAVENOUS PRN
Status: DISCONTINUED | OUTPATIENT
Start: 2024-03-05 | End: 2024-03-05 | Stop reason: SURG

## 2024-03-05 RX ORDER — SODIUM CHLORIDE, SODIUM LACTATE, POTASSIUM CHLORIDE, CALCIUM CHLORIDE 600; 310; 30; 20 MG/100ML; MG/100ML; MG/100ML; MG/100ML
INJECTION, SOLUTION INTRAVENOUS CONTINUOUS
Status: DISCONTINUED | OUTPATIENT
Start: 2024-03-05 | End: 2024-03-05 | Stop reason: HOSPADM

## 2024-03-05 RX ORDER — DEXAMETHASONE SODIUM PHOSPHATE 4 MG/ML
INJECTION, SOLUTION INTRA-ARTICULAR; INTRALESIONAL; INTRAMUSCULAR; INTRAVENOUS; SOFT TISSUE
Status: COMPLETED | OUTPATIENT
Start: 2024-03-05 | End: 2024-03-05

## 2024-03-05 RX ORDER — HYDROMORPHONE HYDROCHLORIDE 1 MG/ML
0.2 INJECTION, SOLUTION INTRAMUSCULAR; INTRAVENOUS; SUBCUTANEOUS
Status: DISCONTINUED | OUTPATIENT
Start: 2024-03-05 | End: 2024-03-05 | Stop reason: HOSPADM

## 2024-03-05 RX ORDER — BUPIVACAINE HYDROCHLORIDE 5 MG/ML
INJECTION, SOLUTION EPIDURAL; INTRACAUDAL
Status: DISCONTINUED | OUTPATIENT
Start: 2024-03-05 | End: 2024-03-05 | Stop reason: HOSPADM

## 2024-03-05 RX ORDER — HYDROMORPHONE HYDROCHLORIDE 1 MG/ML
0.4 INJECTION, SOLUTION INTRAMUSCULAR; INTRAVENOUS; SUBCUTANEOUS
Status: DISCONTINUED | OUTPATIENT
Start: 2024-03-05 | End: 2024-03-05 | Stop reason: HOSPADM

## 2024-03-05 RX ORDER — HALOPERIDOL 5 MG/ML
1 INJECTION INTRAMUSCULAR
Status: DISCONTINUED | OUTPATIENT
Start: 2024-03-05 | End: 2024-03-05 | Stop reason: HOSPADM

## 2024-03-05 RX ORDER — ONDANSETRON 2 MG/ML
INJECTION INTRAMUSCULAR; INTRAVENOUS PRN
Status: DISCONTINUED | OUTPATIENT
Start: 2024-03-05 | End: 2024-03-05 | Stop reason: SURG

## 2024-03-05 RX ORDER — METRONIDAZOLE 500 MG/1
500 TABLET ORAL 3 TIMES DAILY
COMMUNITY

## 2024-03-05 RX ADMIN — ONDANSETRON 4 MG: 2 INJECTION INTRAMUSCULAR; INTRAVENOUS at 08:42

## 2024-03-05 RX ADMIN — DEXAMETHASONE SODIUM PHOSPHATE 1.3 MG: 4 INJECTION, SOLUTION INTRA-ARTICULAR; INTRALESIONAL; INTRAMUSCULAR; INTRAVENOUS; SOFT TISSUE at 07:51

## 2024-03-05 RX ADMIN — MIDAZOLAM HYDROCHLORIDE 2 MG: 1 INJECTION, SOLUTION INTRAMUSCULAR; INTRAVENOUS at 07:50

## 2024-03-05 RX ADMIN — FENTANYL CITRATE 50 MCG: 50 INJECTION, SOLUTION INTRAMUSCULAR; INTRAVENOUS at 08:42

## 2024-03-05 RX ADMIN — KETOROLAC TROMETHAMINE 15 MG: 15 INJECTION, SOLUTION INTRAMUSCULAR; INTRAVENOUS at 08:42

## 2024-03-05 RX ADMIN — LIDOCAINE HYDROCHLORIDE 40 MG: 20 INJECTION, SOLUTION EPIDURAL; INFILTRATION; INTRACAUDAL at 07:56

## 2024-03-05 RX ADMIN — CEFAZOLIN 2 G: 1 INJECTION, POWDER, FOR SOLUTION INTRAMUSCULAR; INTRAVENOUS at 07:58

## 2024-03-05 RX ADMIN — PROPOFOL 200 MG: 10 INJECTION, EMULSION INTRAVENOUS at 07:56

## 2024-03-05 RX ADMIN — SODIUM CHLORIDE, POTASSIUM CHLORIDE, SODIUM LACTATE AND CALCIUM CHLORIDE: 600; 310; 30; 20 INJECTION, SOLUTION INTRAVENOUS at 07:49

## 2024-03-05 RX ADMIN — FENTANYL CITRATE 50 MCG: 50 INJECTION, SOLUTION INTRAMUSCULAR; INTRAVENOUS at 07:55

## 2024-03-05 RX ADMIN — ROPIVACAINE HYDROCHLORIDE 20 ML: 5 INJECTION EPIDURAL; INFILTRATION; PERINEURAL at 07:51

## 2024-03-05 RX ADMIN — DEXAMETHASONE SODIUM PHOSPHATE 6 MG: 4 INJECTION INTRA-ARTICULAR; INTRALESIONAL; INTRAMUSCULAR; INTRAVENOUS; SOFT TISSUE at 07:58

## 2024-03-05 ASSESSMENT — PAIN DESCRIPTION - PAIN TYPE
TYPE: SURGICAL PAIN

## 2024-03-05 NOTE — ANESTHESIA TIME REPORT
Anesthesia Start and Stop Event Times       Date Time Event    3/5/2024 0748 Ready for Procedure     0749 Anesthesia Start          Responsible Staff  03/05/24      Name Role Begin End    Zechariah Narvaez M.D. Anesth 0749           Overtime Reason:  no overtime (within assigned shift)    Comments:

## 2024-03-05 NOTE — ANESTHESIA POSTPROCEDURE EVALUATION
Patient: Yani Moore    Procedure Summary       Date: 03/05/24 Room / Location: [place] [address] / SURGERY [facility]    Anesthesia Start: 0749 Anesthesia Stop: 0850    Procedures:       RIGHT ANKLE BIMALLEOLAR OPEN REDUCTION INTERNAL FIXATION, SYNDESMOTIC (Right: Ankle)      REPAIR, DELTOID LIGAMENT (Right: Ankle) Diagnosis:       Bimalleolar ankle fracture, right, closed, initial encounter      (Bimalleolar ankle fracture, right, closed,)    Surgeons: Mitch Ambriz M.D. Responsible Provider: Zechariah Narvaez M.D.    Anesthesia Type: general, peripheral nerve block ASA Status: 2            Final Anesthesia Type: general, peripheral nerve block  Last vitals  BP   Blood Pressure: (!) 141/86    Temp   36.3 °C (97.4 °F)    Pulse   74   Resp   18    SpO2   94 %      Anesthesia Post Evaluation    Patient location during evaluation: PACU  Patient participation: complete - patient participated  Level of consciousness: awake and alert    Airway patency: patent  Anesthetic complications: no  Cardiovascular status: hemodynamically stable  Respiratory status: acceptable  Hydration status: euvolemic    PONV: none          No notable events documented.     Nurse Pain Score: 0 (NPRS)

## 2024-03-05 NOTE — ANESTHESIA TIME REPORT
Anesthesia Start and Stop Event Times       Date Time Event    3/5/2024 0748 Ready for Procedure     0749 Anesthesia Start     0850 Anesthesia Stop          Responsible Staff  03/05/24      Name Role Begin End    Zechariah Narvaez M.D. Anesth 0749 0850          Overtime Reason:  no overtime (within assigned shift)    Comments:

## 2024-03-05 NOTE — OR NURSING
0848 Pt arrived from OR via gurney. Report given by anesthesia and RN. 98.6. sleeping, 8L mask even non labored breathing. Lungs clear airway patent. SR. Skin pink warm dry. Piv patent. RLE elevated, walking boot, dressing cdi no drainage, toes pink warm brisk cap refill. Glasses in chart    0855, 0913 updated Asha, mother, via text     0916 awake, sitting up drinking water. Clear speech, follows commands. Glasses on pt. Denies pain and nausea at this time.    0931 given personal belongs and cell phone. Eating crackers     0941 playing on personal cell phone    0954 awake alert. RA even non labored breathing. Lungs clear airway patent. DENIES pain and nausea. RLE dressing cdi, toes pink warm brisk cap refill.     1014 report given to Alysia IVAN, rm 32, phase 2 ready for transfer

## 2024-03-05 NOTE — ANESTHESIA PROCEDURE NOTES
Airway    Date/Time: 3/5/2024 7:57 AM    Performed by: Zechariah Narvaez M.D.  Authorized by: Zechariah Narvaez M.D.    Location:  OR  Urgency:  Elective  Indications for Airway Management:  Anesthesia      Spontaneous Ventilation: absent    Sedation Level:  Deep  Preoxygenated: Yes    Final Airway Type:  Supraglottic airway  Final Supraglottic Airway:  Standard LMA    SGA Size:  4  Number of Attempts at Approach:  1

## 2024-03-05 NOTE — OP REPORT
SURGEON:  Mitch Ambriz MD      PREOPERATIVE DIAGNOSIS:    1.  Right bimalleolar ankle fracture  2.  Right syndesmotic disruption  3.  Right deltoid avulsion    POSTOPERATIVE DIAGNOSIS:    Same    PROCEDURES PERFORMED:      1.  Open reduction internal fixation right bimalleolar ankle fracture  2.  Open reduction internal fixation right syndesmosis  3.  Open repair right deltoid    ASSISTANT: GRACIELA Dover    ANESTHESIA:   General with peripheral nerve block requested by me for postop pain control    IMPLANTS: [employer] lateral distal fibula plate with 3.5 millimeter screws; [employer] SynchFix syndesmotic fixation device; Arthrex DX fiber tack    TOURNIQUET TIME: 29 minutes at 250 mmHg    EBL: 10 cc    COMPLICATIONS:  None.    DISPOSITION:  Stable to Post Anesthesia Care Unit.    INDICATIONS: Yani is a pleasant 43-year-old female sustained a syncopal episode and fell and twisted her right ankle.  She was splinted for soft tissue rest given the unstable nature of her injury, she is now amenable to surgical repair.    PROCEDURE IN DETAIL:   Greeted in the preop holding area and identified by name medical record number.  The right lower extremity was marked.  Risks of the procedure including bleeding, infection, pain, malunion, nonunion, neurovascular damage and need for more surgery were discussed and she provided written consent.  She was taken to the operating room and placed on table in supine position.  Preop antibiotics were administered and general anesthesia was induced.  A nonsterile tourniquet was placed on the right thigh and the right lower extremity prepped and draped in the usual sterile fashion.  An operative pause was taken where all present were in agreement with patient identification, laterality and procedure to be performed.  The limb was elevated for 5 minutes and the tourniquet raised to 250 mmHg.    We made a 6 centimeter longitudinal incision along the posterolateral border the  distal 6 centimeters in the distal fibula.  Sharp dissection was carried down to bone.  We mobilized the distal fragment meticulously cleaned it with a combination of sharp dissection and Hernandez tip suction.  We used a point-to-point reduction clamp to hold an anatomic reduction.  I selected a lateral plate given the distal nature of the fracture.  We hand contoured the plate to the lateral aspect of the bone.  We used combination of locking and nonlocking screws to hold an anatomic reduction of the fibula.  On removal of the clamp this was maintained.  We then stressed the ankle and noted persistent instability of both the syndesmosis and the medial clear space.  There is a small avulsion fragment within the deltoid.    We are able to hold an anatomic reduction of the syndesmosis while we drilled for and placed a FanminderFix syndesmotic fixation device through the open hole in the plate, we made a small medial incision to seat the medial plate appropriately.  We were careful not to over tighten.  On tightening the syndesmosis was now stable and anatomically reduced however the medial Clear space remained unstable.    We extended the medial incision distally to a length of about 4 cm.  We curved anteriorly in line with the saphenous vein.  Blunt dissection was carried down to the deltoid which was completely avulsed off of the medial malleolus.  I placed 1-0 Vicryl suture in figure-of-eight fashion attempting to avoid using an anchor however it did not hold appropriately.  We used a rongeur to remove small osteophytes on the medial malleolus and make a landing spot for anchor.  We copiously irrigated the joint.  We placed a fiber tack DX anchor 1 cm from the tip of the medial malleolus.  The limbs of suture were passed through the deep and superficial deltoid and modified Brown-Kayode fashion.  The ankle was held in neutral alignment while they were tied.  On tying them the medial clear space was now  stable to valgus tilt and external rotation.  No evidence of interval complication.  Tourniquet was let down and hemostasis achieved.  Incision copiously irrigated.  Medially as subcutaneous layer was closed with 3-0 Monocryl buried erupted fashion.  Skin closed with 3-0 nylon horizontal mattress fashion.  Laterally the fascia layer was closed over the plate with 3-0 Monocryl in figure-of-eight fashion.  Subcutaneous layer closed with 3-0 Monocryl buried interrupted fashion.  Skin closed with 3-0 nylon horizontal mattress fashion.  Adaptic gauze and compressive wrap were placed.  She was placed in a cam walker boot.  Awakened and extubated in stable condition.    POSTOPERATIVE COURSE: She will discharge home today assuming adequate pain control mobilization.  Weightbearing as tolerated in a cam walker boot.  Like her to start physical therapy in 1 to 2 weeks.  Boot for approximately 6 weeks depending on healing.  I will see her in 10 to 14 days for suture removal and wound check.    Mitch Ambriz MD

## 2024-03-05 NOTE — ANESTHESIA PREPROCEDURE EVALUATION
Case: [id number] Date/Time: 03/05/24 0800    Procedures:       RIGHT ANKLE BRIMALLEOLAR OPEN REDUCTION INTERNAL FIXATION, SYNDESMOTIC, POSSIBLE DELTOID, REPAIRS AS INDICATED      REPAIR, LIGAMENT    Diagnosis: Bimalleolar ankle fracture, right, closed, initial encounter [S82.888Q]    Pre-op diagnosis: Bimalleolar ankle fracture, right, closed, initial encounter [L29.551R]    Location: [place] [address] / SURGERY [facility]    Surgeons: Mitch Ambriz M.D.            Relevant Problems   No relevant active problems       Physical Exam    Airway   Mallampati: II  TM distance: >3 FB  Neck ROM: full       Cardiovascular - normal exam  Rhythm: regular  Rate: normal     Dental - normal exam             Pulmonary - normal exam     Abdominal    Neurological - normal exam                 Anesthesia Plan    ASA 2       Plan - general and peripheral nerve block     Peripheral nerve block will be post-op pain control  Airway plan will be LMA          Induction: intravenous    Postoperative Plan: Postoperative administration of opioids is intended.    Pertinent diagnostic labs and testing reviewed    Informed Consent:    Anesthetic plan and risks discussed with patient.    Use of blood products discussed with: patient whom consented to blood products.

## 2024-03-05 NOTE — OR NURSING
1014: Report received from MONCHO Hatfield. Patient to Phase II 32 pending transport. Patient provided with water. Patient tolerating PO intake.    1019: Patient to Phase II Rm 32. Right ankle dressing CDI with post-op boot in place. Vitals taken upon arrival. Plan of care discussed with patient. Patient belongings returned to patient at bedside.    1025: Patient and family updated regarding Plan of care.    1040: Pt discharged home. Discharge instructions given to pt prior to leaving unit including when to see PCP, and new medications if applicable. PIV removed. All questions answered. Verbalized understanding. All belongings with pt when leaving unit via wheelchair with CNA & mother.

## 2024-03-05 NOTE — ANESTHESIA PROCEDURE NOTES
Peripheral Block    Date/Time: 3/5/2024 7:51 AM    Performed by: Zechariah Narvaez M.D.  Authorized by: Zechariah Narvaez M.D.    Start Time:  3/5/2024 7:51 AM  End Time:  3/5/2024 7:56 AM  Reason for Block: at surgeon's request and post-op pain management ONLY    patient identified, IV checked, site marked, risks and benefits discussed, surgical consent, monitors and equipment checked, pre-op evaluation and timeout performed    Patient Position:  Supine  Prep: ChloraPrep    Monitoring:  Heart rate, continuous pulse ox and cardiac monitor  Block Region:  Lower Extremity  Lower Extremity - Block Type:  SCIATIC nerve block, lateral approach    Laterality:  Right  Procedures: ultrasound guided  Image captured, interpreted and electronically stored.  Local Infiltration:  Lidocaine  Strength:  1 %  Dose:  3 ml  Block Type:  Single-shot  Needle Length:  100mm  Needle Gauge:  21 G  Needle Localization:  Ultrasound guidance  Ultrasound picture in chart  Injection Assessment:  Negative aspiration for heme, no paresthesia on injection, incremental injection and local visualized surrounding nerve on ultrasound  Evidence of intravascular injection: No     US Guided Sciatic Nerve Block   US probe placed several cm proximal to popliteal crease on posterior thigh and scanned caudad and cephalad until Sciatic Nerve (SN) identified superficial/lateral to popliteal artery.  Needle inserted lateral to probe in an in plane approach under direct visualization to a perineural position.  After negative aspiration LA injected with ease and visualized surrounding the SN.

## 2024-03-05 NOTE — DISCHARGE INSTRUCTIONS
HOME CARE INSTRUCTIONS    ACTIVITY: Rest and take it easy for the first 24 hours.  A responsible adult is recommended to remain with you during that time.  It is normal to feel sleepy.  We encourage you to not do anything that requires balance, judgment or coordination.    FOR 24 HOURS DO NOT:  Drive, operate machinery or run household appliances.  Drink beer or alcoholic beverages.  Make important decisions or sign legal documents.    SPECIAL INSTRUCTIONS:   Weight bearing as tolerated right lower extremity in Boot   Boot when walking and sleeping   Start physical therapy in 1-2 weeks   Ice and elevate   Stay ahead of pain   Keep dressing clean and dry   Aspirin 81mg two times a day for clot prevention   Refer to BRIDGER packet for further instructions    DIET: To avoid nausea, slowly advance diet as tolerated, avoiding spicy or greasy foods for the first day.  Add more substantial food to your diet according to your physician's instructions.  Babies can be fed formula or breast milk as soon as they are hungry.  INCREASE FLUIDS AND FIBER TO AVOID CONSTIPATION.    MEDICATIONS: Resume taking daily medication.  Take prescribed pain medication with food.  If no medication is prescribed, you may take non-aspirin pain medication if needed.  PAIN MEDICATION CAN BE VERY CONSTIPATING.  Take a stool softener or laxative such as senokot, pericolace, or milk of magnesia if needed.    Prescription given for Vistaril, Oxycodone.     A follow-up appointment should be arranged with your doctor; call to schedule.    You should CALL YOUR PHYSICIAN if you develop:  Fever greater than 101 degrees F.  Pain not relieved by medication, or persistent nausea or vomiting.  Excessive bleeding (blood soaking through dressing) or unexpected drainage from the wound.  Extreme redness or swelling around the incision site, drainage of pus or foul smelling drainage.  Inability to urinate or empty your bladder within 8 hours.  Problems with breathing or  chest pain.    You should call 911 if you develop problems with breathing or chest pain.  If you are unable to contact your doctor or surgical center, you should go to the nearest emergency room or urgent care center.  Physician's telephone #: [phone]    MILD FLU-LIKE SYMPTOMS ARE NORMAL.  YOU MAY EXPERIENCE GENERALIZED MUSCLE ACHES, THROAT IRRITATION, HEADACHE AND/OR SOME NAUSEA.    If any questions arise, call your doctor.  If your doctor is not available, please feel free to call the Surgical Center at [phone].  The Center is open Monday through Friday from 7AM to 7PM.      A registered nurse may call you a few days after your surgery to see how you are doing after your procedure.    You may also receive a survey in the mail within the next two weeks and we ask that you take a few moments to complete the survey and return it to us.  Our goal is to provide you with very good care and we value your comments.     Depression / Suicide Risk    As you are discharged from this [facility] facility, it is important to learn how to keep safe from harming yourself.    Recognize the warning signs:  Abrupt changes in personality, positive or negative- including increase in energy   Giving away possessions  Change in eating patterns- significant weight changes-  positive or negative  Change in sleeping patterns- unable to sleep or sleeping all the time   Unwillingness or inability to communicate  Depression  Unusual sadness, discouragement and loneliness  Talk of wanting to die  Neglect of personal appearance   Rebelliousness- reckless behavior  Withdrawal from people/activities they love  Confusion- inability to concentrate     If you or a loved one observes any of these behaviors or has concerns about self-harm, here's what you can do:  Talk about it- your feelings and reasons for harming yourself  Remove any means that you might use to hurt yourself (examples: pills, rope, extension cords, firearm)  Get  professional help from the community (Mental Health, Substance Abuse, psychological counseling)  Do not be alone:Call your Safe Contact- someone whom you trust who will be there for you.  Call your local CRISIS HOTLINE 795-4706 or 204-267-0731  Call your local Children's Mobile Crisis Response Team [place] (246) 903-8381 or www.Embrace+  Call the toll free National Suicide Prevention Hotlines   National Suicide Prevention Lifeline 187-320-JEKS (7615)  [facility] Line Network 800-SUICIDE (332-5260)    I acknowledge receipt and understanding of these Home Care instructions.

## 2024-03-18 ENCOUNTER — PHYSICAL THERAPY (OUTPATIENT)
Dept: PHYSICAL THERAPY | Facility: REHABILITATION | Age: 44
End: 2024-03-18
Attending: ORTHOPAEDIC SURGERY
Payer: MEDICAID

## 2024-03-18 DIAGNOSIS — S82.841A BIMALLEOLAR ANKLE FRACTURE, RIGHT, CLOSED, INITIAL ENCOUNTER: ICD-10-CM

## 2024-03-18 DIAGNOSIS — M25.571 ACUTE RIGHT ANKLE PAIN: ICD-10-CM

## 2024-03-18 PROCEDURE — 97162 PT EVAL MOD COMPLEX 30 MIN: CPT | Performed by: PHYSICAL THERAPIST

## 2024-03-18 PROCEDURE — 97110 THERAPEUTIC EXERCISES: CPT | Performed by: PHYSICAL THERAPIST

## 2024-03-18 SDOH — ECONOMIC STABILITY: GENERAL: QUALITY OF LIFE: GOOD

## 2024-03-18 ASSESSMENT — ENCOUNTER SYMPTOMS
PAIN SCALE: 2
EXACERBATED BY: ACTIVITY
EXACERBATED BY: STANDING
EXACERBATED BY: SQUATTING
QUALITY: ACHING
PAIN TIMING: WHEN ACTIVE
EXACERBATED BY: STAIR CLIMBING
EXACERBATED BY: WALKING
PAIN TIMING: INTERMITTENT
ALLEVIATING FACTORS: REST

## 2024-03-18 NOTE — OP THERAPY EVALUATION
Outpatient Physical Therapy  INITIAL EVALUATION    [facility] [place]  [address], [address]  [place] [address]  Phone:  [phone]  Fax:  [phone]    Date of Evaluation: 2024    Patient: Yani Moore  YOB: 1980  MRN: [id number]     Referring Provider: Mitch Ambriz M.D.  [address] [place] [address]   Referring Diagnosis Bimalleolar ankle fracture, right, closed, initial encounter [S82.841A];Acute right ankle pain [M25.571]     Time Calculation  Start time: 1615  Stop time: 1700 Time Calculation (min): 45 minutes         Chief Complaint: Ankle Problem    Visit Diagnoses     ICD-10-CM   1. Bimalleolar ankle fracture, right, closed, initial encounter  S82.841A   2. Acute right ankle pain  M25.571       Date of onset of impairment: 3/5/2024    Subjective:   History of Present Illness:     Date of surgery:  3/5/2024    Mechanism of injury:  Patient had surgery to her R ankle, DOS: 3/5/2024.  1.  Open reduction internal fixation right bimalleolar ankle fracture  2.  Open reduction internal fixation right syndesmosis  3.  Open repair right deltoid    Patient reports she just saw surgeon and had sutures removed. She is WBAT in the boot until 5 weeks, when she sees the surgeon again.    Patient reports that walking in her boot was rough at first but has been getting better.    Pain has become more manageable. She is off oxy and is now using tylenol and advil instead.    She still has some trouble going down stairs and does it sideways.     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           Quality of life:  Good  Pain:     Current pain ratin    Quality:  Aching    Pain timing:  Intermittent and when active    Relieving factors:  Rest    Aggravating factors:  Activity, squatting, walking, stair climbing and standing     Progression:  Improving  Patient Goals:     Patient goals for therapy:  Decreased edema, decreased pain, improved balance, increased motion, increased strength, independence with ADLs/IADLs, return to sport/leisure activities and return to work      Past Medical History:   Diagnosis Date    Asthma, extrinsic     GERD (gastroesophageal reflux disease)     IBS (irritable bowel syndrome)     Indigestion     Migraine     Psychiatric problem     Bi polar and generalized anxiety disorder     Past Surgical History:   Procedure Laterality Date    PB REPAIR BOTH COLLAT ANKL LIGMT,PBIMRY Right 3/5/2024    Procedure: REPAIR, DELTOID LIGAMENT;  Surgeon: Mitch Ambriz M.D.;  Location: [facility];  Service: Orthopedics    ORIF, ANKLE Right 3/5/2024    Procedure: RIGHT ANKLE BIMALLEOLAR OPEN REDUCTION INTERNAL FIXATION, SYNDESMOTIC;  Surgeon: Mitch Ambriz M.D.;  Location: SUR[facility]ERY [facility];  Service: Orthopedics    HERNIA REPAIR      ingu     Social History     Tobacco Use    Smoking status: Former    Smokeless tobacco: Not on file   Substance Use Topics    Alcohol use: Yes     Comment: 1 drink per week     Family and Occupational History     Socioeconomic History    Marital status: Single     Spouse name: Not on file    Number of children: Not on file    Years of education: Not on file    Highest education level: Not on file   Occupational History    Not on file       Objective     Observations     Right Ankle/Foot   Positive for edema and incision.     Additional Observation Details  Scars appear closed with normal wound healing. Mild to moderate swelling.    Neurological Testing     Sensation     Ankle/Foot   Left Ankle/Foot   Intact: light touch    Right Ankle/Foot   Intact: light touch     Palpation     Right   Hypertonic in the anterior tibialis, lateral gastrocnemius, medial gastrocnemius and soleus.   Tenderness of the anterior tibialis, lateral gastrocnemius, medial gastrocnemius and soleus.     Active  Range of Motion   Left Ankle/Foot   Dorsiflexion (ke): 10 degrees   Plantar flexion: 45 degrees   Inversion: 30 degrees   Eversion: 35 degrees     Right Ankle/Foot   Dorsiflexion (ke): -10 degrees   Plantar flexion: 25 degrees   Inversion: 7 degrees   Eversion: 10 degrees     Additional Active Range of Motion Details  Patient is lacking 10 deg from neutral on R DF AROM    Passive Range of Motion     Right Ankle/Foot    Dorsiflexion (ke): -8 degrees   Plantar flexion: 28 degrees     Additional Passive Range of Motion Details  PROM minimally due to post op status    Strength:      Left Ankle/Foot   Dorsiflexion: 4+  Plantar flexion: 4+  Inversion: 4+  Eversion: 4+    Additional Strength Details  Strength on RLE not formally assessed due to post op status, but had patient perform muscle setting interventions which demo that she can use these muscles very lightly for now.    General Comments     Ankle/Foot Comments   Flexibility: maximal flexibility deficit to R gastroc and soleus, moderate to anterior tibialis        Therapeutic Exercises (CPT 64121):     1. ankle pumps, x20    2. gastroc stretch, 1'    3. soleus stretch, 1'    4. ankle inversion / eversion, x10 ea    5. seated heel raise, x10      Therapeutic Exercise Summary: HEP: ankle pumps, gastroc and soleus stretch, ankle inversion/eversion on towel, 4 way muscle setting, seated heel raise      Time-based treatments/modalities:    Physical Therapy Timed Treatment Charges  Therapeutic exercise minutes (CPT 88788): 10 minutes      Assessment, Response and Plan:   Impairments: abnormal or restricted ROM, hypersensitivity, impaired physical strength, lacks appropriate home exercise program, pain with function, safety issue and weight-bearing intolerance    Assessment details:  Patient is a 44 yo female s/p R ankle bimalleolar fx and syndesmotic ORIF, with open repair of right deltoid (DOS: 3/5/2024). Patient presents with normal post operative deficits to pain free  AROM, flexibility, strength, and balance. She has minimal antalgia and gait deficits ambulating in cam boot. Her incisions appear to be healing well, andpatient has good understanding of her current restrictions and of continued self management. Alisha will benefit from a comprehensive POC and HEP in skilled PT to address her deficits and restore pain free gait and function.   Prognosis: good    Goals:   Short Term Goals:   1. Patient will demonstrate independent regular performance of tailored home exercise program in order to facilitate recovery and promote self treatment and patient ownership of recovery   2. Patient will demo normal flexibility to bilateral gastroc and soleus muscles in order to enable improved DF AROM needed for normal gait  3. Patient will demo R ankle DF AROM >/= 10 deg needed for normal gait mechanics  4. Patient will demo R ankle PF AROM >/= 50 deg needed for normal gait and stair navigation  Short term goal time span:  2-4 weeks      Long Term Goals:    1. Patient will demo R ankle inversion / eversion strength >/= 4/5 pain free  2. Patient will demo normal reciprocal gait out of boot (once allowed by MD) for at least 500 ft in order to indicate readiness to return to community ambulation distances.  3. Patient will self report </= 35% disability via LEFS functional assessment questionnaire (currently 61%)  Long term goal time span:  2-4 months    Plan:   Planned therapy interventions:  Neuromuscular Re-education (CPT 48319) and Therapeutic Exercise (CPT 69158)  Frequency:  2x week  Duration in weeks:  8  Discussed with:  Patient  Plan details:  1x / week for 4 weeks. Then 2x/week for another 4-6 weeks after that. Anticipate 1x 97112, 2x 97110 units used per session      Functional Assessment Used  Lower Extremity Functional Scale Percentage: 38.75     Referring provider co-signature:  I have reviewed this plan of care and my co-signature certifies the need for services.    Certification  Period: 03/18/2024 to  05/13/24    Physician Signature: ________________________________ Date: ______________

## 2024-03-21 ENCOUNTER — NON-PROVIDER VISIT (OUTPATIENT)
Dept: OCCUPATIONAL MEDICINE | Facility: CLINIC | Age: 44
End: 2024-03-21

## 2024-03-21 ENCOUNTER — HOSPITAL ENCOUNTER (OUTPATIENT)
Facility: MEDICAL CENTER | Age: 44
End: 2024-03-21
Attending: NURSE PRACTITIONER
Payer: COMMERCIAL

## 2024-03-21 DIAGNOSIS — Z57.8 EMPLOYEE EXPOSURE TO BLOOD: ICD-10-CM

## 2024-03-21 DIAGNOSIS — Z57.8 EMPLOYEE EXPOSURE TO BLOOD: Primary | ICD-10-CM

## 2024-03-21 LAB
ALBUMIN SERPL BCP-MCNC: 4.4 G/DL (ref 3.2–4.9)
ALBUMIN/GLOB SERPL: 1.5 G/DL
ALP SERPL-CCNC: 95 U/L (ref 30–99)
ALT SERPL-CCNC: 24 U/L (ref 2–50)
ANION GAP SERPL CALC-SCNC: 15 MMOL/L (ref 7–16)
AST SERPL-CCNC: 18 U/L (ref 12–45)
BILIRUB SERPL-MCNC: 0.3 MG/DL (ref 0.1–1.5)
BUN SERPL-MCNC: 16 MG/DL (ref 8–22)
CALCIUM ALBUM COR SERPL-MCNC: 9.1 MG/DL (ref 8.5–10.5)
CALCIUM SERPL-MCNC: 9.4 MG/DL (ref 8.5–10.5)
CHLORIDE SERPL-SCNC: 105 MMOL/L (ref 96–112)
CO2 SERPL-SCNC: 18 MMOL/L (ref 20–33)
CREAT SERPL-MCNC: 0.81 MG/DL (ref 0.5–1.4)
ERYTHROCYTE [DISTWIDTH] IN BLOOD BY AUTOMATED COUNT: 42.9 FL (ref 35.9–50)
GFR SERPLBLD CREATININE-BSD FMLA CKD-EPI: 91 ML/MIN/1.73 M 2
GLOBULIN SER CALC-MCNC: 3 G/DL (ref 1.9–3.5)
GLUCOSE SERPL-MCNC: 100 MG/DL (ref 65–99)
HBV CORE AB SERPL QL IA: NONREACTIVE
HBV SURFACE AB SERPL IA-ACNC: 1612 MIU/ML (ref 0–10)
HBV SURFACE AG SER QL: ABNORMAL
HCT VFR BLD AUTO: 43.2 % (ref 37–47)
HCV AB SER QL: ABNORMAL
HGB BLD-MCNC: 14.2 G/DL (ref 12–16)
HIV 1+2 AB+HIV1 P24 AG SERPL QL IA: ABNORMAL
MCH RBC QN AUTO: 28.8 PG (ref 27–33)
MCHC RBC AUTO-ENTMCNC: 32.9 G/DL (ref 32.2–35.5)
MCV RBC AUTO: 87.6 FL (ref 81.4–97.8)
PLATELET # BLD AUTO: 371 K/UL (ref 164–446)
PMV BLD AUTO: 11.5 FL (ref 9–12.9)
POTASSIUM SERPL-SCNC: 4 MMOL/L (ref 3.6–5.5)
PROT SERPL-MCNC: 7.4 G/DL (ref 6–8.2)
RBC # BLD AUTO: 4.93 M/UL (ref 4.2–5.4)
SODIUM SERPL-SCNC: 138 MMOL/L (ref 135–145)
WBC # BLD AUTO: 7.3 K/UL (ref 4.8–10.8)

## 2024-03-21 SDOH — HEALTH STABILITY - PHYSICAL HEALTH: OCCUPATIONAL EXPOSURE TO OTHER RISK FACTORS: Z57.8

## 2024-03-25 ENCOUNTER — PHYSICAL THERAPY (OUTPATIENT)
Dept: PHYSICAL THERAPY | Facility: REHABILITATION | Age: 44
End: 2024-03-25
Attending: ORTHOPAEDIC SURGERY
Payer: MEDICAID

## 2024-03-25 DIAGNOSIS — S82.841A BIMALLEOLAR ANKLE FRACTURE, RIGHT, CLOSED, INITIAL ENCOUNTER: ICD-10-CM

## 2024-03-25 PROCEDURE — 97110 THERAPEUTIC EXERCISES: CPT | Performed by: PHYSICAL THERAPIST

## 2024-03-25 PROCEDURE — 97112 NEUROMUSCULAR REEDUCATION: CPT | Performed by: PHYSICAL THERAPIST

## 2024-03-25 NOTE — OP THERAPY DAILY TREATMENT
"  Outpatient Physical Therapy  DAILY TREATMENT     [facility] [place]  [address], [address]  [facility] [address]  Phone:  [phone]  Fax:  [phone]    Date: 03/25/2024    Patient: Yani Moore  YOB: 1980  MRN: [id number]     Time Calculation    Start time: 1400  Stop time: 1445 Time Calculation (min): 45 minutes     Chief Complaint: Ankle Problem    Visit #: 2    SUBJECTIVE:  Yani reports that she is     OBJECTIVE:  Current objective measures: Patient seen for first time follow up with presentation consistent with initial evlauation           Therapeutic Exercises (CPT 29793):     1. Nu step w/u, 6'    2. gastroc stretch, 1'    3. soleus stretch, 1'    4. ankle inversion / eversion, x10 ea    5. 4 way isometric, x10+1\" x2    6. 4 way ankle light ankle stretch      Therapeutic Exercise Summary: HEP: ankle pumps, gastroc and soleus stretch, ankle inversion/eversion on towel, 4 way muscle setting, seated heel raise      Time-based treatments/modalities:    Physical Therapy Timed Treatment Charges  Neuromusc re-ed, balance, coor, post minutes (CPT 01655): 15 minutes  Therapeutic exercise minutes (CPT 40204): 30 minutes    ASSESSMENT:   Response to treatment:     Good initial improvement to ROM with PT. DF: 0, PF: 40, inversion 15 deg, eversion: 25 deg. No discomfort throughout today's session above 3/10, reported most with light MFR to TDH muscles and to distal medial gastroc. Continue to maintain precaution with WB in boot until discontinued by surgeon at follow up.     PLAN/RECOMMENDATIONS:   Plan for treatment: therapy treatment to continue next visit.  Planned interventions for next visit: continue with current treatment.         "

## 2024-03-29 ENCOUNTER — PHYSICAL THERAPY (OUTPATIENT)
Dept: PHYSICAL THERAPY | Facility: REHABILITATION | Age: 44
End: 2024-03-29
Attending: ORTHOPAEDIC SURGERY
Payer: MEDICAID

## 2024-03-29 DIAGNOSIS — S82.841A BIMALLEOLAR ANKLE FRACTURE, RIGHT, CLOSED, INITIAL ENCOUNTER: ICD-10-CM

## 2024-03-29 PROCEDURE — 97112 NEUROMUSCULAR REEDUCATION: CPT | Performed by: PHYSICAL THERAPIST

## 2024-03-29 PROCEDURE — 97110 THERAPEUTIC EXERCISES: CPT | Performed by: PHYSICAL THERAPIST

## 2024-03-29 NOTE — OP THERAPY DAILY TREATMENT
"  Outpatient Physical Therapy  DAILY TREATMENT     [facility] [place]  [address]  [facility] [address]  Phone:  [phone]  Fax:  [phone]    Date: 03/29/2024    Patient: Yani Moore  YOB: 1980  MRN: [id number]     Time Calculation    Start time: 1100  Stop time: 1149 Time Calculation (min): 49 minutes         Chief Complaint: Ankle Problem    Visit #: 3    SUBJECTIVE:  Yani reports continued improvement and adherence to her HEP    OBJECTIVE:  Current objective measures: Plantar flexion AROM: 40 deg. DF AROM: lacking 5 deg from neutral          Therapeutic Exercises (CPT 13100):     1. Nu step w/u, 6', in boot    4. ankle inversion / eversion, x10 ea    5. 4 way isometric, x10+1\" x2    6. 4 way ankle light ankle stretch, 1' ea    7. BAPS, FWD/BWD, SS, CW/CCW x25 ea      Therapeutic Exercise Summary: HEP: ankle pumps, gastroc and soleus stretch, ankle inversion/eversion on towel, 4 way muscle setting, seated heel raise      Time-based treatments/modalities:    Physical Therapy Timed Treatment Charges  Neuromusc re-ed, balance, coor, post minutes (CPT 30788): 30 minutes  Therapeutic exercise minutes (CPT 22001): 19 minutes        ASSESSMENT:   Response to treatment: Yani's ankle ROM is continuing to improve, but remains limited post surgically. Continue targeting normalized range (especially in DF, PF) before progressing to significant functional strengthening progression.    PLAN/RECOMMENDATIONS:   Plan for treatment: therapy treatment to continue next visit.  Planned interventions for next visit: continue with current treatment.         "

## 2024-04-01 ENCOUNTER — APPOINTMENT (OUTPATIENT)
Dept: PHYSICAL THERAPY | Facility: REHABILITATION | Age: 44
End: 2024-04-01
Attending: ORTHOPAEDIC SURGERY
Payer: MEDICAID

## 2024-04-08 ENCOUNTER — PHYSICAL THERAPY (OUTPATIENT)
Dept: PHYSICAL THERAPY | Facility: REHABILITATION | Age: 44
End: 2024-04-08
Attending: ORTHOPAEDIC SURGERY
Payer: MEDICAID

## 2024-04-08 DIAGNOSIS — S82.841A BIMALLEOLAR ANKLE FRACTURE, RIGHT, CLOSED, INITIAL ENCOUNTER: ICD-10-CM

## 2024-04-08 PROCEDURE — 97112 NEUROMUSCULAR REEDUCATION: CPT | Performed by: PHYSICAL THERAPIST

## 2024-04-08 PROCEDURE — 97110 THERAPEUTIC EXERCISES: CPT | Performed by: PHYSICAL THERAPIST

## 2024-04-08 NOTE — OP THERAPY DAILY TREATMENT
"  Outpatient Physical Therapy  DAILY TREATMENT     [facility] [place]  [address]  [facility] [address]  Phone:  [phone]  Fax:  [phone]    Date: 04/08/2024    Patient: Yani Moore  YOB: 1980  MRN: [id number]     Time Calculation    Start time: 1615  Stop time: 1659 Time Calculation (min): 44 minutes     Chief Complaint: Ankle Problem    Visit #: 4    SUBJECTIVE:  Yani reports that she hasn't had too many issues with her foot. She hasn't been doing as much of her exercises because of it being busy, but she has been doing more walking.     OBJECTIVE:  Current objective measures: No reported discomfort throughout session. Moderate anterior tibialis flexibility restriction evident when performing BAPS          Therapeutic Exercises (CPT 74813):     1. Nu step w/u, 6', in boot    4. ankle inversion / eversion, x10 ea    5. 4 way isometric, x10+1\" x2    6. 4 way ankle light ankle stretch, 1' ea    7. BAPS, FWD/BWD, SS, CW/CCW x25 ea    8. posterior tibialis strength, 2x to fatigue, pink TB    9. ankle eversion, 2x to fatigue, pink TB      Therapeutic Exercise Summary: HEP: ankle pumps, gastroc and soleus stretch, ankle inversion/eversion on towel, 4 way muscle setting, seated heel raise      Time-based treatments/modalities:    Physical Therapy Timed Treatment Charges  Neuromusc re-ed, balance, coor, post minutes (CPT 15629): 24 minutes  Therapeutic exercise minutes (CPT 90494): 20 minutes    ASSESSMENT:   Response to treatment: Yani is currently 4 weeks, 6 days post op. She has a follow up in two weeks with her surgeon. We will reach out to his office to determine they would like to see Yani before authorizing her to ambulate out of boot, or if it is okay for PT to progress this at 6 weeks post op. Good improvement so far. Limited plantar flexion range with anterior tibialis restriction. Implemented stretch with improvement during " session.    PLAN/RECOMMENDATIONS:   Plan for treatment: therapy treatment to continue next visit.  Planned interventions for next visit: continue with current treatment.

## 2024-04-15 ENCOUNTER — APPOINTMENT (OUTPATIENT)
Dept: PHYSICAL THERAPY | Facility: REHABILITATION | Age: 44
End: 2024-04-15
Attending: ORTHOPAEDIC SURGERY
Payer: MEDICAID

## 2024-04-22 ENCOUNTER — PHYSICAL THERAPY (OUTPATIENT)
Dept: PHYSICAL THERAPY | Facility: REHABILITATION | Age: 44
End: 2024-04-22
Attending: ORTHOPAEDIC SURGERY
Payer: MEDICAID

## 2024-04-22 DIAGNOSIS — S82.841A BIMALLEOLAR ANKLE FRACTURE, RIGHT, CLOSED, INITIAL ENCOUNTER: ICD-10-CM

## 2024-04-22 PROCEDURE — 97110 THERAPEUTIC EXERCISES: CPT | Performed by: PHYSICAL THERAPIST

## 2024-04-22 PROCEDURE — 97112 NEUROMUSCULAR REEDUCATION: CPT | Performed by: PHYSICAL THERAPIST

## 2024-04-22 NOTE — OP THERAPY PROGRESS SUMMARY
Outpatient Physical Therapy  PROGRESS SUMMARY NOTE      [facility] [place]  [address], [address]  [place] [address]  Phone:  [phone]  Fax:  [phone]    Date of Visit: 04/22/2024    Patient: Yani Moore  YOB: 1980  MRN: [id number]     Referring Provider: Mitch Ambriz M.D.  [address] [place] [address]   Referring Diagnosis Unspecified injury of left shoulder and upper arm, initial encounter [S49.92XA]     Visit Diagnoses     ICD-10-CM   1. Bimalleolar ankle fracture, right, closed, initial encounter  S82.841A       Rehab Potential: excellent    Progress Report Period: 3/18/2023-4/22/2024    Functional Assessment Used          Objective Findings and Assessment:   Patient progression towards goals: DOS: 3/5/2024.  1.  Open reduction internal fixation right bimalleolar ankle fracture  2.  Open reduction internal fixation right syndesmosis  3.  Open repair right deltoid     Yani reports that she saw her surgeon this afternoon. She was cleared to return to work, to remove the boot, and no longer has restrictions. She is instructed to return in 6 weeks for final check up.    She has not really had any issues lately but also just removed the boot this afternoon and isn't sure how that will feel.     Objective findings and assessment details: ASSESSMENT  Yani is demonstrating excellent improvement in physical therapy. She has gained significant AROM and strength and was just instructed to ambulate without a walking boot from follow-up visit with her surgeon. Out of the boot, she presents with greatest deficits remaining to DF AROM and to general ankle strength which is also impacting balance, gait, and transfer mechanics. I updated Yani's HEP today and her POC in therapy. I recommend approximately another 4 weeks to address her remaining deficits and maximize therapeutic outcome.    OBJECTIVE     Palpation      Right   Hypertonic in the anterior  tibialis, lateral gastrocnemius, medial gastrocnemius and soleus.   Tenderness of the anterior tibialis, lateral gastrocnemius, medial gastrocnemius and soleus.      Active Range of Motion   Left Ankle/Foot   Dorsiflexion (ke): 10 degrees   Plantar flexion: 45 degrees   Inversion: 30 degrees   Eversion: 35 degrees      Right Ankle/Foot   Dorsiflexion (ke): -2 degrees   Plantar flexion: 40 degrees   Inversion: 40 degrees   Eversion: 20 degrees      Additional Active Range of Motion Details  Patient is lacking 2 deg from neutral on R DF AROM     Passive Range of Motion      Right Ankle/Foot     Dorsiflexion (ke): 0 degrees   Plantar flexion: 45 degrees      Strength:       Left Ankle/Foot   Dorsiflexion: 4+  Plantar flexion: 4+  Inversion: 4+  Eversion: 4+    Right Ankle  Dorsiflexion: 3+  Plantar flexion: 3+  Inversion: 3+  Eversion: 3+       General Comments      Ankle/Foot Comments   Flexibility: moderate flexibility deficit to R gastroc and soleus, moderate to anterior tibialis      Goals:   Short Term Goals:   1. Patient will demonstrate independent regular performance of tailored home exercise program in order to facilitate recovery and promote self treatment and patient ownership of recovery -- MET --  2. Patient will demo normal flexibility to bilateral gastroc and soleus muscles in order to enable improved DF AROM needed for normal gait -- IN PROGRESS, moderate --  3. Patient will demo R ankle DF AROM >/= 10 deg needed for normal gait mechanics -- IN PROGRESS, -2 --  4. Patient will demo R ankle PF AROM >/= 50 deg needed for normal gait and stair navigation -- IN PROGRESS, 42 deg --    Short term goal time span:  2-4 weeks      Long Term Goals:    1. Patient will demo R ankle inversion / eversion strength >/= 4/5 pain free -- IN PROGRESS, 3+/5 --  2. Patient will demo normal reciprocal gait out of boot (once allowed by MD) for at least 500 ft in order to indicate readiness to return to community ambulation  distances. -- IN PROGRESS, boot removed today --  3. Patient will self report </= 35% disability via LEFS functional assessment questionnaire (currently 61%) -- INPROGRESS --  Long term goal time span:  6-8 weeks    Plan:   Planned therapy interventions:  Neuromuscular Re-education (CPT 52934), Therapeutic Exercise (CPT 43961), Therapeutic Activities (CPT 01334) and Manual Therapy (CPT 94915)  Frequency:  2x week  Duration in weeks:  4      Referring provider co-signature:  I have reviewed this plan of care and my co-signature certifies the need for services.     Certification Period: 04/22/2024 to 05/20/24    Physician Signature: ________________________________ Date: ______________

## 2024-04-22 NOTE — OP THERAPY DAILY TREATMENT
Outpatient Physical Therapy  DAILY TREATMENT     [facility] Outpatient [facility] Therapy [place]  [address]  [facility] [address]  Phone:  [phone]  Fax:  [phone]    Date: 04/22/2024    Patient: Yani Moore  YOB: 1980  MRN: [id number]     Time Calculation    Start time: 1530  Stop time: 1613 Time Calculation (min): 43 minutes         Chief Complaint: Ankle Problem    Visit #: 5    SUBJECTIVE:  Please see [facility]    OBJECTIVE:  Current objective measures: Please see [facility]          Therapeutic Exercises (CPT 04950):     1. gastroc stretch, 2'    2. soleus stretch, 2'    3. standing heel raise, 2x to fatigue    4. tandem balance, 2x to fatigue ea    5. single leg balance, 2x to fatigue    6. ankle eversion, purple TB 2x to fatigue    7. ankle inversion, purple TB 2x to fatigue    8. body weight mini squat at mirror for visual feedback, x10, shiting way from RLE    9. NuStep w/u, 7'      Therapeutic Exercise Summary: Updated HEP: gastroc and soleus stretch, standing heel raise, tandem balance, single leg balance, ankle eversion and inversion c purple TB, sit to stand      Time-based treatments/modalities:    Physical Therapy Timed Treatment Charges  Neuromusc re-ed, balance, coor, post minutes (CPT 64530): 15 minutes  Therapeutic exercise minutes (CPT 09501): 28 minutes    ASSESSMENT:   Response to treatment: Please see [facility]    PLAN/RECOMMENDATIONS:   Plan for treatment: therapy treatment to continue next visit.  Planned interventions for next visit: continue with current treatment.

## 2024-04-27 ENCOUNTER — HOSPITAL ENCOUNTER (OUTPATIENT)
Dept: RADIOLOGY | Facility: MEDICAL CENTER | Age: 44
End: 2024-04-27
Attending: STUDENT IN AN ORGANIZED HEALTH CARE EDUCATION/TRAINING PROGRAM
Payer: MEDICAID

## 2024-04-27 ENCOUNTER — APPOINTMENT (OUTPATIENT)
Dept: RADIOLOGY | Facility: MEDICAL CENTER | Age: 44
End: 2024-04-27
Attending: EMERGENCY MEDICINE
Payer: MEDICAID

## 2024-04-27 ENCOUNTER — HOSPITAL ENCOUNTER (EMERGENCY)
Facility: MEDICAL CENTER | Age: 44
End: 2024-04-27
Attending: EMERGENCY MEDICINE
Payer: MEDICAID

## 2024-04-27 VITALS
SYSTOLIC BLOOD PRESSURE: 135 MMHG | HEART RATE: 60 BPM | BODY MASS INDEX: 33.39 KG/M2 | HEIGHT: 70 IN | RESPIRATION RATE: 16 BRPM | DIASTOLIC BLOOD PRESSURE: 76 MMHG | TEMPERATURE: 97.8 F | WEIGHT: 233.25 LBS | OXYGEN SATURATION: 94 %

## 2024-04-27 DIAGNOSIS — I26.93 SINGLE SUBSEGMENTAL PULMONARY EMBOLISM WITHOUT ACUTE COR PULMONALE (HCC): ICD-10-CM

## 2024-04-27 DIAGNOSIS — R07.89 CHEST DISCOMFORT: ICD-10-CM

## 2024-04-27 LAB
ALBUMIN SERPL BCP-MCNC: 4.5 G/DL (ref 3.2–4.9)
ALBUMIN/GLOB SERPL: 1.5 G/DL
ALP SERPL-CCNC: 92 U/L (ref 30–99)
ALT SERPL-CCNC: 25 U/L (ref 2–50)
ANION GAP SERPL CALC-SCNC: 15 MMOL/L (ref 7–16)
AST SERPL-CCNC: 21 U/L (ref 12–45)
BASOPHILS # BLD AUTO: 0.9 % (ref 0–1.8)
BASOPHILS # BLD: 0.08 K/UL (ref 0–0.12)
BILIRUB SERPL-MCNC: 0.3 MG/DL (ref 0.1–1.5)
BUN SERPL-MCNC: 14 MG/DL (ref 8–22)
CALCIUM ALBUM COR SERPL-MCNC: 9 MG/DL (ref 8.5–10.5)
CALCIUM SERPL-MCNC: 9.4 MG/DL (ref 8.5–10.5)
CHLORIDE SERPL-SCNC: 99 MMOL/L (ref 96–112)
CO2 SERPL-SCNC: 21 MMOL/L (ref 20–33)
CREAT SERPL-MCNC: 0.66 MG/DL (ref 0.5–1.4)
EKG IMPRESSION: NORMAL
EOSINOPHIL # BLD AUTO: 0.19 K/UL (ref 0–0.51)
EOSINOPHIL NFR BLD: 2.2 % (ref 0–6.9)
ERYTHROCYTE [DISTWIDTH] IN BLOOD BY AUTOMATED COUNT: 41.2 FL (ref 35.9–50)
GFR SERPLBLD CREATININE-BSD FMLA CKD-EPI: 110 ML/MIN/1.73 M 2
GLOBULIN SER CALC-MCNC: 3.1 G/DL (ref 1.9–3.5)
GLUCOSE SERPL-MCNC: 123 MG/DL (ref 65–99)
HCG SERPL QL: NEGATIVE
HCT VFR BLD AUTO: 44.2 % (ref 37–47)
HGB BLD-MCNC: 14.8 G/DL (ref 12–16)
IMM GRANULOCYTES # BLD AUTO: 0.02 K/UL (ref 0–0.11)
IMM GRANULOCYTES NFR BLD AUTO: 0.2 % (ref 0–0.9)
LYMPHOCYTES # BLD AUTO: 2.36 K/UL (ref 1–4.8)
LYMPHOCYTES NFR BLD: 27.6 % (ref 22–41)
MCH RBC QN AUTO: 28.7 PG (ref 27–33)
MCHC RBC AUTO-ENTMCNC: 33.5 G/DL (ref 32.2–35.5)
MCV RBC AUTO: 85.8 FL (ref 81.4–97.8)
MONOCYTES # BLD AUTO: 0.67 K/UL (ref 0–0.85)
MONOCYTES NFR BLD AUTO: 7.8 % (ref 0–13.4)
NEUTROPHILS # BLD AUTO: 5.22 K/UL (ref 1.82–7.42)
NEUTROPHILS NFR BLD: 61.3 % (ref 44–72)
NRBC # BLD AUTO: 0 K/UL
NRBC BLD-RTO: 0 /100 WBC (ref 0–0.2)
PLATELET # BLD AUTO: 388 K/UL (ref 164–446)
PMV BLD AUTO: 10.6 FL (ref 9–12.9)
POTASSIUM SERPL-SCNC: 3.7 MMOL/L (ref 3.6–5.5)
PROT SERPL-MCNC: 7.6 G/DL (ref 6–8.2)
RBC # BLD AUTO: 5.15 M/UL (ref 4.2–5.4)
SODIUM SERPL-SCNC: 135 MMOL/L (ref 135–145)
TROPONIN T SERPL-MCNC: <6 NG/L (ref 6–19)
WBC # BLD AUTO: 8.5 K/UL (ref 4.8–10.8)

## 2024-04-27 PROCEDURE — 86147 CARDIOLIPIN ANTIBODY EA IG: CPT | Mod: 91

## 2024-04-27 PROCEDURE — 700102 HCHG RX REV CODE 250 W/ 637 OVERRIDE(OP): Mod: UD | Performed by: EMERGENCY MEDICINE

## 2024-04-27 PROCEDURE — 36415 COLL VENOUS BLD VENIPUNCTURE: CPT

## 2024-04-27 PROCEDURE — 93005 ELECTROCARDIOGRAM TRACING: CPT

## 2024-04-27 PROCEDURE — 85610 PROTHROMBIN TIME: CPT

## 2024-04-27 PROCEDURE — 85306 CLOT INHIBIT PROT S FREE: CPT

## 2024-04-27 PROCEDURE — 80053 COMPREHEN METABOLIC PANEL: CPT

## 2024-04-27 PROCEDURE — 84703 CHORIONIC GONADOTROPIN ASSAY: CPT

## 2024-04-27 PROCEDURE — 81241 F5 GENE: CPT

## 2024-04-27 PROCEDURE — A9270 NON-COVERED ITEM OR SERVICE: HCPCS | Mod: UD | Performed by: EMERGENCY MEDICINE

## 2024-04-27 PROCEDURE — 93005 ELECTROCARDIOGRAM TRACING: CPT | Performed by: EMERGENCY MEDICINE

## 2024-04-27 PROCEDURE — 700117 HCHG RX CONTRAST REV CODE 255: Mod: UD

## 2024-04-27 PROCEDURE — 71275 CT ANGIOGRAPHY CHEST: CPT

## 2024-04-27 PROCEDURE — 85303 CLOT INHIBIT PROT C ACTIVITY: CPT

## 2024-04-27 PROCEDURE — 85613 RUSSELL VIPER VENOM DILUTED: CPT | Mod: 91

## 2024-04-27 PROCEDURE — 85730 THROMBOPLASTIN TIME PARTIAL: CPT

## 2024-04-27 PROCEDURE — 84484 ASSAY OF TROPONIN QUANT: CPT

## 2024-04-27 PROCEDURE — 81240 F2 GENE: CPT

## 2024-04-27 PROCEDURE — 85025 COMPLETE CBC W/AUTO DIFF WBC: CPT

## 2024-04-27 PROCEDURE — 71045 X-RAY EXAM CHEST 1 VIEW: CPT

## 2024-04-27 PROCEDURE — 99285 EMERGENCY DEPT VISIT HI MDM: CPT

## 2024-04-27 RX ADMIN — APIXABAN 10 MG: 5 TABLET, FILM COATED ORAL at 20:39

## 2024-04-27 RX ADMIN — IOHEXOL 80 ML: 350 INJECTION, SOLUTION INTRAVENOUS at 15:15

## 2024-04-27 ASSESSMENT — FIBROSIS 4 INDEX: FIB4 SCORE: 0.45

## 2024-04-28 NOTE — DISCHARGE INSTRUCTIONS
Please use the attached information to schedule follow-up with anticoagulation clinic.  Follow-up with Dr. Bassett as well.  We discussed today's visit with him, to get their input and choosing a blood thinner.  Return to the emergency department for severe or worsening symptoms in the meantime.

## 2024-04-28 NOTE — ED TRIAGE NOTES
Yani Moore  43 y.o. female  Chief Complaint   Patient presents with    Chest Pain     3/10 left sided CP x 1 week    Shortness of Breath     Onset today    Sent by MD     CT today showed possible pulmonary embolism. Hx uterine cancer     Pt ambulatory to triage with steady gait for above complaint.     Pt is GCS 15, speaking in full sentences, follows commands and responds appropriately to questions. Resp are even and unlabored.     SOB and CP protocol ordered. Pt placed in draw room hallway. Pt educated on triage process. Pt encouraged to alert staff for any changes.       Vitals:    04/27/24 1731   BP: (!) 140/94   Pulse: 71   Resp: 18   Temp: 36.4 °C (97.5 °F)   SpO2: 98%

## 2024-04-28 NOTE — ED PROVIDER NOTES
ER Provider Note    Scribed for Gonsalo Mcgee M.d. by Jennifer Crocker. 4/27/2024  6:36 PM    Primary Care Provider: ANGEL Cortes    CHIEF COMPLAINT  Chief Complaint   Patient presents with    Chest Pain     3/10 left sided CP x 1 week    Shortness of Breath     Onset today    Sent by MD     CT today showed possible pulmonary embolism. Hx uterine cancer     EXTERNAL RECORDS REVIEWED  Outside records shows CT from today, ordered appropriately for exertional shortness of breath and chest pain, demonstrating:    IMPRESSION:     1. Limited exam with poor opacification of the pulmonary arteries.  2. There is a possible small amount of right pulmonary artery embolism.      HPI/ROS  OUTSIDE HISTORIAN(S):  Family at bedside who endorsed the patient's symptoms and provided additional history as seen below.     Yani Moore is a 43 y.o. female who presents to the ED complaining of chest tightness on the right side onset one week ago.  She has no obvious triggers for the pain itself, but has noticed some mild exertional shortness of breath. Her doctor ordered a CT scan which was completed today and shows a possible pulmonary embolism. Patient has a recent diagnosis of uterine cancer and has plans to have a hysterectomy on 5/14/24 by Dr. Bassett. She reports that she had a syncopal episode in the beginning of March of this year and fractured her right ankle.     PAST MEDICAL HISTORY  Past Medical History:   Diagnosis Date    Asthma, extrinsic     GERD (gastroesophageal reflux disease)     IBS (irritable bowel syndrome)     Indigestion     Migraine     Psychiatric problem     Bi polar and generalized anxiety disorder    Uterine cancer (HCC) 03/16/2024       SURGICAL HISTORY  Past Surgical History:   Procedure Laterality Date    PB REPAIR BOTH COLLAT ANKL LIGMT,PBIMRY Right 3/5/2024    Procedure: REPAIR, DELTOID LIGAMENT;  Surgeon: Mitch Ambriz M.D.;  Location: SURGERY [facility];  Service: Orthopedics    ORIF,  "ANKLE Right 3/5/2024    Procedure: RIGHT ANKLE BIMALLEOLAR OPEN REDUCTION INTERNAL FIXATION, SYNDESMOTIC;  Surgeon: Mitch Ambriz M.D.;  Location: SURGERY [facility];  Service: Orthopedics    HERNIA REPAIR      ingu       FAMILY HISTORY  History reviewed. No pertinent family history.    SOCIAL HISTORY   reports that she has quit smoking. She does not have any smokeless tobacco history on file. She reports current alcohol use. She reports current drug use. Drug: Inhaled.    CURRENT MEDICATIONS  Previous Medications    ACETAMINOPHEN (TYLENOL) 500 MG TABS    Take 500-1,000 mg by mouth every 6 hours as needed for Mild Pain.    BUSPIRONE (BUSPAR) 10 MG TAB TABLET    Take 10 mg by mouth 2 times a day.    DIVALPROEX (DEPAKOTE) 250 MG TABLET DELAYED RESPONSE    Take 250 mg by mouth every morning.    DIVALPROEX (DEPAKOTE) 500 MG TABLET DELAYED RESPONSE    Take 500 mg by mouth every evening.    GABAPENTIN (NEURONTIN) 300 MG CAP    1 po q hs prn nerve pain    HYDROCODONE-ACETAMINOPHEN (NORCO) 5-325 MG TAB PER TABLET    Take 1 Tablet by mouth every four hours as needed (PAIN).    KETOROLAC (TORADOL) 10 MG TAB    Take 10 mg by mouth every 6 hours as needed for Mild Pain.    METRONIDAZOLE (FLAGYL) 500 MG TAB    Take 500 mg by mouth 3 times a day. Pt completed a 7 days course on 2/29    TOPIRAMATE (TOPAMAX) 50 MG TABLET    Take 50 mg by mouth 2 times a day.    VRAYLAR 4.5 MG CAP    Take 4.5 mg by mouth every evening.       ALLERGIES  Serotonin, Sumatriptan, and Latex    PHYSICAL EXAM  VITAL SIGNS: BP (!) 140/94   Pulse 71   Temp 36.4 °C (97.5 °F) (Temporal)   Resp 18   Ht 1.778 m (5' 10\")   Wt 106 kg (233 lb 4 oz)   SpO2 98%   BMI 33.47 kg/m²   Pulse ox interpretation: I interpret this pulse ox as normal.  Constitutional: Alert in no apparent distress.  HENT: No signs of trauma, Bilateral external ears normal, Nose normal.   Eyes: Conjunctiva normal, Non-icteric.   Neck: Normal range of motion, Supple, No stridor. "   Lymphatic: No lymphadenopathy noted.   Cardiovascular: Regular rate and rhythm, no murmurs.   Thorax & Lungs: Normal breath sounds, No respiratory distress, No wheezing  Abdomen: Bowel sounds normal, Soft, No tenderness, No masses, No pulsatile masses. No peritoneal signs.  Skin: Warm, Dry, No erythema, No rash.   Back: No midline bony tenderness.   Extremities: Intact distal pulses, No edema, No cyanosis.  Musculoskeletal: Good range of motion in all major joints. No or major deformities noted.   Neurologic: Alert , Normal motor function, Normal sensory function, No focal deficits noted.   Psychiatric: Affect normal, Judgment normal, Mood normal.     DIAGNOSTIC STUDIES    Labs:   Labs Reviewed   COMP METABOLIC PANEL - Abnormal; Notable for the following components:       Result Value    Glucose 123 (*)     All other components within normal limits   CBC WITH DIFFERENTIAL   TROPONIN   HCG QUAL SERUM   ESTIMATED GFR   PROTEIN C FUNCTIONAL   PROTEIN S FUNCTIONAL   FACTOR V LEIDEN MUTATION   LUPUS ANTICOAGULANT   ANTITHROMBIN III FUNC/IMMUNOL   ANTICARDIOLIPIN AB IGG,IGM,IGA   FACTOR II DNA ANALYSIS       EKG:   I have independently interpreted this EKG  Results for orders placed or performed during the hospital encounter of 24   EKG   Result Value Ref Range    Report       [facility] Emergency Dept.    Test Date:  2024  Pt Name:    MITALI ARAUZ                 Department: ER  MRN:        [id number]                      Room:  Gender:     Female                       Technician: [id number]  :        1980                   Requested By:ER TRIAGE PROTOCOL  Order #:    [id number]                    Reading MD:    Measurements  Intervals                                Axis  Rate:       64                           P:          23  MO:         171                          QRS:        -8  QRSD:       113                          T:          46  QT:         415  QTc:        428    Interpretive  Statements  Sinus rhythm  Incomplete left bundle branch block  Low voltage, precordial leads  No previous ECG available for comparison           Radiology:   The attending emergency physician has independently interpreted the diagnostic imaging associated with this visit and am waiting the final reading from the radiologist.   Preliminary interpretation is a follows: Unremarkable chest x-ray  Radiologist interpretation:   DX-CHEST-PORTABLE (1 VIEW)   Final Result      No acute cardiac or pulmonary abnormalities are identified.           COURSE & MEDICAL DECISION MAKING     INITIAL ASSESSMENT, COURSE AND PLAN  Care Narrative:     5:49 PM -per protocol, triage ordered EKG, DX-Chest, HCG Qual Serum, Troponins NOW, CMP, CBC w/ Diff.     6:40 PM  - Patient evaluated at the bedside.  She presents to the ED with chest tightness onset one week ago and shortness of breath with exertion, cancer diagnosis, and a recent bimalleolar fracture with associated orthopedic surgery.  Patient had a CT scan which showed a possible pulmonary embolism.  We discussed that she has multiple significant risk factors for PE, and multiple symptoms consistent with PE, and that while there was some hedging to the radiology read, I think it is most appropriate to treat this is a real finding, as it matches her signs and symptoms and risk factors.  We discussed that repeat imaging is possibility, but the patient has recent cancer diagnosis, and has likely upcoming exposure to relatively extensive amounts of radiation, and repeat imaging is inevitable, associated with evaluation for resolution of clot burden.  After extensive discussion with the patient and her parents at the bedside, we have agreed that it makes the most sense to proceed with short-term term anticoagulation. Based on this discussion, I will consult with the patient's surgeon for the best course of blood thinners prior to her scheduled hysterectomy. She agrees to follow up with the  anticoagulation clinic. Ordered per protocol labs associated with referral to anticoagulation.  The patient had an unremarkable chest x-ray without evidence of pneumonia, no fevers.  I think respiratory infection is unlikely.  Basic labs will be reviewed to exclude troponin elevation/cardiac strain from PE, seems unlikely.    7:29 PM  - I discussed the patient's case and the above findings with Dr. Bassett's PA Eloisa Moya ( Gynecology-Oncology) who appreciated the call, and assured me that Dr. Bassett is fine with Eliquis as the anticoagulation agent for this patient and that it will not interfere with her upcoming surgical plan.  They will reach out to the patient as needed for temporary cessation of her medication.    7:53 PM labs do not show any signs of infection or cardiac strain.  The patient can be discharged to her anticoagulation clinic referral, and scheduled follow-up with surgical oncology.  I returned to the bedside, discussed the reassuring lab test results with her, make sure she understands the plan and decision making process regarding Eliquis anticoagulation, and answered all questions.  She is happy to be discharged home.  She will call Monday to schedule a first anticoagulation clinic visit.      ADDITIONAL PROBLEM MANAGED  The patient's recent uterine cancer diagnosis and scheduled surgery complicated this visit significantly.    DISPOSITION AND DISCUSSIONS  I have discussed management of the patient with the following physicians and JOSE's: Eloisa Moya from Dr. Bassett's office, as above.    Escalation of care considered, and ultimately not performed: acute inpatient care management, however at this time, the patient is most appropriate for outpatient management.  This might have been necessary if there was evidence of cor pulmonale, or if the patient's surgeon preferred warfarin as the anticoagulation agent, but neither of these turned out to be the case that the patient can be discharged to  follow-up with her surgical oncologist and anticoagulation clinic.    Decision tools and prescription drugs considered including, but not limited to: Medication modification was performed to discharge to start the patient on a course of Eliquis .     The patient will return for new or worsening symptoms and is stable at the time of discharge.    The patient is referred to a primary physician for blood pressure management, diabetic screening, and for all other preventative health concerns.    DISPOSITION:  Patient will be discharged home in stable condition.    FOLLOW UP:  No follow-up provider specified.    OUTPATIENT MEDICATIONS:  New Prescriptions    APIXABAN (ELIQUIS) 5MG TAB    Take 2 Tablets by mouth 2 times a day for 7 days, THEN 1 Tablet 2 times a day for 23 days.         FINAL DIAGNOSIS  1. Single subsegmental pulmonary embolism without acute cor pulmonale (HCC)          Jennifer HEROZG (Jessy), am scribing for, and in the presence of, Gonsalo Mcgee M.D..    Electronically signed by: Jennifer Crocker (Jessy), 4/27/2024    Gonsalo HERZOG M.D. personally performed the services described in this documentation, as scribed by Jennifer Crocker in my presence, and it is both accurate and complete.

## 2024-04-29 ENCOUNTER — TELEPHONE (OUTPATIENT)
Dept: VASCULAR LAB | Facility: MEDICAL CENTER | Age: 44
End: 2024-04-29
Payer: MEDICAID

## 2024-04-29 ENCOUNTER — PHYSICAL THERAPY (OUTPATIENT)
Dept: PHYSICAL THERAPY | Facility: REHABILITATION | Age: 44
End: 2024-04-29
Attending: ORTHOPAEDIC SURGERY
Payer: MEDICAID

## 2024-04-29 DIAGNOSIS — S82.841A BIMALLEOLAR ANKLE FRACTURE, RIGHT, CLOSED, INITIAL ENCOUNTER: ICD-10-CM

## 2024-04-29 PROCEDURE — 97110 THERAPEUTIC EXERCISES: CPT | Performed by: PHYSICAL THERAPIST

## 2024-04-29 NOTE — TELEPHONE ENCOUNTER
[facility] Heart and Vascular Health and Pharmacotherapy Programs     Received anticoagulation referral from [facility] on 04/27/24.     Called patient to schedule an appt to establish care. No answer. LVM.    1st call     Insurance: Commercial/Medicaid  PCP: External  Locations to be seen: [facility]    If no response by 05/27/24 OR 2 no shows/cancellations, will remove from referral list     [facility] Anticoagulation/Pharmacotherapy Clinic at [phone], fax [phone]    Willy Diaz, RobbyD

## 2024-04-29 NOTE — OP THERAPY DAILY TREATMENT
Outpatient Physical Therapy  DAILY TREATMENT     [facility] Outpatient Physical Therapy [place]  [address], [address]  [place] [address]  Phone:  [phone]  Fax:  [phone]    Date: 04/29/2024    Patient: Yani Moore  YOB: 1980  MRN: [id number]     Time Calculation    Start time: 1615  Stop time: 1700 Time Calculation (min): 45 minutes     Chief Complaint: Ankle Problem    Visit #: 6    SUBJECTIVE:  Yani reports that she did fine with the ankle after the last visit.     OBJECTIVE:  Current objective measures: NT          Therapeutic Exercises (CPT 25809):     1. gastroc stretch, 2'    2. soleus stretch, 2'    3. standing heel raise, 2x to fatigue    4. tandem balance, 2x to fatigue ea    5. single leg balance, 2x to fatigue    6. ankle eversion, purple TB 2x to fatigue    7. ankle inversion, purple TB 2x to fatigue    8. body weight mini squat at mirror for visual feedback, x10, shiting way from RLE    9. NuStep w/u, 7'    11. walking drills    12. FWD weight shift on flat BOSU      Therapeutic Exercise Summary: Updated HEP: gastroc and soleus stretch, standing heel raise, tandem balance, single leg balance, ankle eversion and inversion c purple TB, sit to stand      Time-based treatments/modalities:    Physical Therapy Timed Treatment Charges  Therapeutic exercise minutes (CPT 01935): 45 minutes    ASSESSMENT:   Response to treatment: Yani is demonstrating excellent overall improvement. We have emphasized normalizing gait, balance, and transfer mechanics now that she is out of her walking boot. Yani has responded well to this but still presents with deficits, and will continue to benefit from strengthening progression, especially to gastrocsoleus and posterior tibialis.    PLAN/RECOMMENDATIONS:   Plan for treatment: therapy treatment to continue next visit.  Planned interventions for next visit: continue with current treatment.

## 2024-04-30 LAB
CARDIOLIPIN IGA SER IA-ACNC: <10 APL
CARDIOLIPIN IGG SER IA-ACNC: <10 GPL
CARDIOLIPIN IGM SER IA-ACNC: 12 MPL
PROT C ACT/NOR PPP: 136 % (ref 83–168)
PROT S ACT/NOR PPP: 103 % (ref 57–131)

## 2024-05-01 ENCOUNTER — DOCUMENTATION (OUTPATIENT)
Dept: VASCULAR LAB | Facility: MEDICAL CENTER | Age: 44
End: 2024-05-01

## 2024-05-01 ENCOUNTER — APPOINTMENT (OUTPATIENT)
Dept: VASCULAR LAB | Facility: MEDICAL CENTER | Age: 44
End: 2024-05-01
Attending: INTERNAL MEDICINE
Payer: MEDICAID

## 2024-05-01 VITALS — HEART RATE: 78 BPM | SYSTOLIC BLOOD PRESSURE: 118 MMHG | DIASTOLIC BLOOD PRESSURE: 69 MMHG

## 2024-05-01 DIAGNOSIS — I26.93 SINGLE SUBSEGMENTAL PULMONARY EMBOLISM WITHOUT ACUTE COR PULMONALE (HCC): ICD-10-CM

## 2024-05-01 LAB
APTT SCREEN TO CONFIRM RATIO: 1.04 {RATIO}
CONFIRM DRVVT STA-STACLOT: NORMAL S
DRVVT SCREEN TO CONFIRM RATIO: 1.11 {RATIO}
DRVVT SCREEN TO CONFIRM RATIO: NORMAL {RATIO}
DRVVT/DRVVT CFM P DOAC NEUT NORM PPP-RTO: NORMAL {RATIO}
HEPARIN NT PPP QL: NORMAL
LA 3 SCREEN W REFLEX-IMP: NORMAL
LMW HEPARIN IND PLT AB SER QL: NORMAL
MIXING DRVVT: NORMAL S
PROTHROMBIN TIME: 13.2 S (ref 12–15.5)
SCREEN APTT: NORMAL S
THROMBIN TIME: NORMAL S

## 2024-05-01 NOTE — PROGRESS NOTES
NEW DOAC     Anticoagulation Patient Findings      PCP: ANGEL Cortes  Dx: Pulmonary Emblism  CHADSVASC = n/a  HAS-BLED = 0 (none)  Target End Date = 8/1/24    Pt Hx: Patient developed chest tightness, CT imaging conducted show possible R pulmonary artery embolism.     Patient has a recent diagnosis of uterine cancer and is planning to have hysterectomy with Dr Bassett on 5/14/24. She also has a history of broken right ankle in March of this year.      No previous history of blood clots, no family history of blood clots.     Imaging 4/27/24  IMPRESSION:        1. Limited exam with poor opacification of the pulmonary arteries.  2. There is a possible small amount of right pulmonary artery embolism    Labs:  Lab Results   Component Value Date/Time    WBC 8.5 04/27/2024 05:54 PM    RBC 5.15 04/27/2024 05:54 PM    HEMOGLOBIN 14.8 04/27/2024 05:54 PM    HEMATOCRIT 44.2 04/27/2024 05:54 PM    MCV 85.8 04/27/2024 05:54 PM    MCH 28.7 04/27/2024 05:54 PM    MCHC 33.5 04/27/2024 05:54 PM    MPV 10.6 04/27/2024 05:54 PM    NEUTSPOLYS 61.30 04/27/2024 05:54 PM    LYMPHOCYTES 27.60 04/27/2024 05:54 PM    MONOCYTES 7.80 04/27/2024 05:54 PM    EOSINOPHILS 2.20 04/27/2024 05:54 PM    BASOPHILS 0.90 04/27/2024 05:54 PM      Lab Results   Component Value Date/Time    SODIUM 135 04/27/2024 05:54 PM    POTASSIUM 3.7 04/27/2024 05:54 PM    CHLORIDE 99 04/27/2024 05:54 PM    CO2 21 04/27/2024 05:54 PM    GLUCOSE 123 (H) 04/27/2024 05:54 PM    BUN 14 04/27/2024 05:54 PM    CREATININE 0.66 04/27/2024 05:54 PM          Pt is new to Eliquis and new to [facility]. Discussed:   Indication for DOAC therapy.  Importance of monitoring and compliance.   Monitoring parameters, signs and symptoms of bleeding or clotting.  DOAC therapy, side effects, potential DDIs, OTC medications  Hormonal therapy - none  Pregnancy -none  DDI -Patient takes Ketorolac PRN, recommended to abstain while on OAC.   Lifestyle safety, ie smoking, ETOH, hobby safety, fall  safety/prevention  Procedures for missed doses or suspected missed doses, surgeries/procedures, travel, dental work, any medication changes    Start with Eliquis 10mg taken 2 times a day for 1 week and then decrease to 5mg taken 2 times daily thereafter. Pt will transition to 5 mg BID dose on 5/4/24      DOAC affordable = yes    Samples provided: no    Labs to be completed prior to next f/u - CBC, CMP    F/U - 1 month    Robby FloresD      Added [facility] Anticoagulation Services to care team   CC: Dr Bloch                                                   [facility] Pharmacotherapy Program Consent      Name    Yani Moore    MRN number: [id number]    the following are guidelines for participation in the [facility] Pharmacotherapy Program.     I, ____Yani Moore_____, understand and voluntarily agree to participate in the [facility] Pharmacotherapy Program and to have services provided to me by pharmacists working in collaboration with my provider.    I understand the pharmacist within the [facility] Pharmacotherapy Program may initiate, modify or discontinue my medications, order appropriate testing and appointments, perform exams, monitor treatment, and make clinical evaluations and decisions pursuant to a collaborative practice agreement with my provider.  I understand the pharmacist within the [facility] Pharmacotherapy Program is not a physician, osteopathic physician, advanced practice registered nurse or physician assistant and may not diagnose.  I will take all my medications as instructed and not change the way I take it without first talking to my provider or a pharmacist within the [facility] Pharmacotherapy Program.  I understand that if I am late to my appointment I may not be able to be seen by a pharmacist at that time and will have to reschedule my appointment.  During appointment with pharmacist I understand that pharmacist has the right not to answer questions or  perform services outside the pharmacist’s scope of practice.  By signing below, I provide informed consent for the pharmacist to provide these services and for my participation in the [facility].      Yani Moore           [id number]          05/01/24  Patient Name                   MRN number  Date     ____Obtained verbal consent from Yani Moore____  Patient Signature

## 2024-05-02 LAB — F2 C.20210G>A GENO BLD/T: NEGATIVE

## 2024-05-02 NOTE — PROGRESS NOTES
Initial anticoag note and most recent ED note reviewed.   Patient started on oac by ED team for possible PE associated with recent surgery and malignancy.   Based on shared decision making in ED, we will continue with at least 3 mo of oral anticoag as recommended by ED.   After 3 mo we can re-eval.   Will talk to clinical pharmacists about ordering venous duplex to see if evidence of DVT to help confirm diagnosis of VTE.   Hypercoag panel is pending.    Michael Bloch, MD  Anticoagulation Clinic    Cc:  DIANA Bassett

## 2024-05-03 ENCOUNTER — APPOINTMENT (OUTPATIENT)
Dept: PHYSICAL THERAPY | Facility: REHABILITATION | Age: 44
End: 2024-05-03
Attending: ORTHOPAEDIC SURGERY
Payer: MEDICAID

## 2024-05-03 LAB — F5 P.R506Q BLD/T QL: NEGATIVE

## 2024-05-06 ENCOUNTER — PHYSICAL THERAPY (OUTPATIENT)
Dept: PHYSICAL THERAPY | Facility: REHABILITATION | Age: 44
End: 2024-05-06
Payer: MEDICAID

## 2024-05-06 DIAGNOSIS — S82.841A BIMALLEOLAR ANKLE FRACTURE, RIGHT, CLOSED, INITIAL ENCOUNTER: ICD-10-CM

## 2024-05-06 NOTE — OP THERAPY DAILY TREATMENT
"  Outpatient Physical Therapy  DAILY TREATMENT     [facility] Outpatient Physical Therapy [place]  [address], [address]  [place] [address]  Phone:  [phone]  Fax:  [phone]    Date: 05/06/2024    Patient: Yani Moroe  YOB: 1980  MRN: [id number]     Time Calculation    Start time: 1445  Stop time: 1527 Time Calculation (min): 42 minutes         Chief Complaint: Ankle Injury    Visit #: 7    SUBJECTIVE:  Pt reports some pain or soreness if walking a lot.  Reports moderate soreness following last PT visit, but \"normal\".   States she was \"speed walking\" on Thursday, and felt fine.    OBJECTIVE:        Therapeutic Exercises (CPT 62004):     1. gastroc stretch, 2x 30 sec R    2. soleus stretch, 2x 30 sec R    3. standing heel raise, on step, 2x30 sec.    4. tandem balance, x30 sec B    5. single leg balance, x30 sec R with intermittent LLE tap for balance    6. 3\" step ups, 2x 30 sec R    7. windshield wipers, x20R    8. R ankle inversion and eversion, extra heavy resistance band, x15 each    9. NuStep resistance level 3, 7 minutes    10. SL stance with opp foot cone taps at 10, 12, and 2, x10B, min intermittent UE support for balance    11. walking drills    12. standing on flat top BOSU, lateral weight shifts x10, PF/DF x10    13. SLS with opp foot ball circles, R stance with L circles, x20 CW/CCW    14. AP weight shift in stride stance, x10B on floor, x10B on Airex    15. seated BAPS, L2, X20 R CW/CCW    16. SLS with opp LE 3 way slide on disc glider, x10B    17. ankle diagonals AROM, in long sitting, x15R      Time-based treatments/modalities:    Physical Therapy Timed Treatment Charges  Therapeutic exercise minutes (CPT 81389): 42 minutes      ASSESSMENT:   Response to treatment: Pt demo improved tolerance for weight bearing and modified R SL dynamic balance activities.  Pt will benefit from continued PT to address R ankle AROM and stability/strength for increased activity tolerance and " higher level weight bearing activities.    PLAN/RECOMMENDATIONS:   Plan for treatment: therapy treatment to continue next visit.  Planned interventions for next visit: continue with current treatment.

## 2024-05-13 ENCOUNTER — PHYSICAL THERAPY (OUTPATIENT)
Dept: PHYSICAL THERAPY | Facility: REHABILITATION | Age: 44
End: 2024-05-13
Payer: MEDICAID

## 2024-05-13 DIAGNOSIS — S82.841A BIMALLEOLAR ANKLE FRACTURE, RIGHT, CLOSED, INITIAL ENCOUNTER: ICD-10-CM

## 2024-05-13 NOTE — OP THERAPY DAILY TREATMENT
"  Outpatient Physical Therapy  DAILY TREATMENT     [facility] Outpatient Physical Therapy [place]  [address], [address]  [place] [address]  Phone:  [phone]  Fax:  [phone]    Date: 05/13/2024    Patient: Yani Moore  YOB: 1980  MRN: [id number]     Time Calculation    Start time: 1400  Stop time: 1455 Time Calculation (min): 55 minutes     Chief Complaint: Ankle Problem    Visit #: 8    SUBJECTIVE:  Yani reports continued improvement. She would like to work toward running when she is able to.    OBJECTIVE:  Current objective measures: Pain to 1/10 with SL heel raise and balance. Modified with contralat toe touch support to 0/10.          Therapeutic Exercises (CPT 41481):     1. Rec bike w/u, 8'    3. standing heel raise, 2x to fatigue    4. tandem balance, 2x to fatigue ea    5. single leg balance, 2x to fatigue    6. ankle eversion, purple TB 2x to fatigue    7. ankle inversion, purple TB 2x to fatigue    8. body weight mini squat at mirror for visual feedback, 2x to fatigue    10. FWD step down off 6\" step, 2x to fatigue    11. walking drills, lateral walk and monster walk pink TB x2 laps    12. FWD weight shift on flat BOSU, 2x10+1\"    13. SL RDL, 2x10, discontinued because patient was fatigued from exercise session    14. SL heel raise, 2x to fatigue c contralat support to control discomfort.      Therapeutic Exercise Summary: Updated HEP: gastroc and soleus stretch, standing heel raise, tandem balance, single leg balance, ankle eversion and inversion c purple TB, sit to stand    Therapeutic Treatments and Modalities:     1. Hot or Cold Pack Therapy (CPT 99507), 10' to R ankle CP    Time-based treatments/modalities:    Physical Therapy Timed Treatment Charges  Therapeutic exercise minutes (CPT 62743): 45 minutes    ASSESSMENT:   Response to treatment: Yani is making excellent improvement for now. We will wait until at least 12 weeks to reintroduce running, but we incorporated " several interventions in today's session that will set her up for success for returning to this when appropriate.     PLAN/RECOMMENDATIONS:   Plan for treatment: therapy treatment to continue next visit.  Planned interventions for next visit: continue with current treatment.

## 2024-05-20 ENCOUNTER — APPOINTMENT (OUTPATIENT)
Dept: PHYSICAL THERAPY | Facility: REHABILITATION | Age: 44
End: 2024-05-20
Payer: MEDICAID

## 2024-05-20 ENCOUNTER — TELEPHONE (OUTPATIENT)
Dept: CARDIOLOGY | Facility: MEDICAL CENTER | Age: 44
End: 2024-05-20
Payer: MEDICAID

## 2024-05-20 DIAGNOSIS — I26.93 SINGLE SUBSEGMENTAL PULMONARY EMBOLISM WITHOUT ACUTE COR PULMONALE (HCC): ICD-10-CM

## 2024-05-20 NOTE — TELEPHONE ENCOUNTER
Placed new US order as stat so pt can obtain prior to upcoming procedure.    Laron Ceron, RobbyD, BCACP

## 2024-05-20 NOTE — TELEPHONE ENCOUNTER
Caller: Yani Moore      Topic/issue: Patient was needing a Vascular Ultrasound but she's not able to schedule it until 8/17 and she has a surgery set for 8/13 for a radical hysterectomy and she was asking for a call back to discuss it further        Callback Number: [phone]      Thank you    -Francisco IQBAL

## 2024-05-28 ENCOUNTER — TELEPHONE (OUTPATIENT)
Dept: VASCULAR LAB | Facility: MEDICAL CENTER | Age: 44
End: 2024-05-28
Payer: MEDICAID

## 2024-05-28 DIAGNOSIS — I26.93 SINGLE SUBSEGMENTAL PULMONARY EMBOLISM WITHOUT ACUTE COR PULMONALE (HCC): ICD-10-CM

## 2024-05-28 NOTE — TELEPHONE ENCOUNTER
Received Refill PA request via [facility]  for ELIQUIS 5MG TABLETS. (Quantity:60, Day Supply:30)     Insurance: [employer] [facility]  Member ID:  [id number]  BIN: [id number]  PCN: [id number]  Group: [id number]     Ran Test claim via [place] & medication  pt would like to request to fill it at [facility] pharmacy. Will release RX to [facility] #[id number]---[address] [address]    BRENDA Shin, PhT  Vascular Pharmacy Liaison (Rx Coordinator)  P: [phone]  5/28/2024 12:01 PM

## 2024-05-28 NOTE — TELEPHONE ENCOUNTER
Caller: Yani Moore    Topic/issue: Patient needs her apixaban (ELIQUIS) 5mg Tab refilled she is almost out, please send to [facility] on [address]     Callback Number: [phone]    Thank You   Fina CALLAWAY

## 2024-06-03 ENCOUNTER — HOSPITAL ENCOUNTER (OUTPATIENT)
Dept: LAB | Facility: MEDICAL CENTER | Age: 44
End: 2024-06-03
Attending: NURSE PRACTITIONER
Payer: MEDICAID

## 2024-06-03 DIAGNOSIS — I26.93 SINGLE SUBSEGMENTAL PULMONARY EMBOLISM WITHOUT ACUTE COR PULMONALE (HCC): ICD-10-CM

## 2024-06-03 LAB
ALBUMIN SERPL BCP-MCNC: 4.4 G/DL (ref 3.2–4.9)
ALBUMIN/GLOB SERPL: 1.4 G/DL
ALP SERPL-CCNC: 71 U/L (ref 30–99)
ALT SERPL-CCNC: 19 U/L (ref 2–50)
ANION GAP SERPL CALC-SCNC: 14 MMOL/L (ref 7–16)
AST SERPL-CCNC: 15 U/L (ref 12–45)
BILIRUB SERPL-MCNC: 0.2 MG/DL (ref 0.1–1.5)
BUN SERPL-MCNC: 17 MG/DL (ref 8–22)
CALCIUM ALBUM COR SERPL-MCNC: 8.7 MG/DL (ref 8.5–10.5)
CALCIUM SERPL-MCNC: 9 MG/DL (ref 8.5–10.5)
CHLORIDE SERPL-SCNC: 109 MMOL/L (ref 96–112)
CO2 SERPL-SCNC: 18 MMOL/L (ref 20–33)
CREAT SERPL-MCNC: 0.7 MG/DL (ref 0.5–1.4)
ERYTHROCYTE [DISTWIDTH] IN BLOOD BY AUTOMATED COUNT: 41.9 FL (ref 35.9–50)
FASTING STATUS PATIENT QL REPORTED: NORMAL
GFR SERPLBLD CREATININE-BSD FMLA CKD-EPI: 108 ML/MIN/1.73 M 2
GLOBULIN SER CALC-MCNC: 3.1 G/DL (ref 1.9–3.5)
GLUCOSE SERPL-MCNC: 100 MG/DL (ref 65–99)
HCT VFR BLD AUTO: 43.6 % (ref 37–47)
HGB BLD-MCNC: 14.5 G/DL (ref 12–16)
MCH RBC QN AUTO: 28 PG (ref 27–33)
MCHC RBC AUTO-ENTMCNC: 33.3 G/DL (ref 32.2–35.5)
MCV RBC AUTO: 84.3 FL (ref 81.4–97.8)
PLATELET # BLD AUTO: 337 K/UL (ref 164–446)
PMV BLD AUTO: 11.7 FL (ref 9–12.9)
POTASSIUM SERPL-SCNC: 3.5 MMOL/L (ref 3.6–5.5)
PROT SERPL-MCNC: 7.5 G/DL (ref 6–8.2)
RBC # BLD AUTO: 5.17 M/UL (ref 4.2–5.4)
SODIUM SERPL-SCNC: 141 MMOL/L (ref 135–145)
WBC # BLD AUTO: 7.1 K/UL (ref 4.8–10.8)

## 2024-06-03 PROCEDURE — 36415 COLL VENOUS BLD VENIPUNCTURE: CPT

## 2024-06-03 PROCEDURE — 85027 COMPLETE CBC AUTOMATED: CPT

## 2024-06-03 PROCEDURE — 80053 COMPREHEN METABOLIC PANEL: CPT

## 2024-06-04 ENCOUNTER — HOSPITAL ENCOUNTER (OUTPATIENT)
Dept: RADIOLOGY | Facility: MEDICAL CENTER | Age: 44
End: 2024-06-04
Attending: INTERNAL MEDICINE
Payer: MEDICAID

## 2024-06-04 ENCOUNTER — DOCUMENTATION (OUTPATIENT)
Dept: VASCULAR LAB | Facility: MEDICAL CENTER | Age: 44
End: 2024-06-04

## 2024-06-04 DIAGNOSIS — I26.93 SINGLE SUBSEGMENTAL PULMONARY EMBOLISM WITHOUT ACUTE COR PULMONALE (HCC): ICD-10-CM

## 2024-06-04 PROCEDURE — 93970 EXTREMITY STUDY: CPT

## 2024-06-05 NOTE — PROGRESS NOTES
No evidence of DVT on ultrasound  Will discuss with patient at follow-up visit    Michael J. Bloch, MD  Vascular Care

## 2024-06-06 ENCOUNTER — APPOINTMENT (OUTPATIENT)
Dept: VASCULAR LAB | Facility: MEDICAL CENTER | Age: 44
End: 2024-06-06
Payer: MEDICAID

## 2024-06-07 ENCOUNTER — ANTICOAGULATION VISIT (OUTPATIENT)
Dept: VASCULAR LAB | Facility: MEDICAL CENTER | Age: 44
End: 2024-06-07
Attending: INTERNAL MEDICINE
Payer: MEDICAID

## 2024-06-07 ENCOUNTER — PHYSICAL THERAPY (OUTPATIENT)
Dept: PHYSICAL THERAPY | Facility: REHABILITATION | Age: 44
End: 2024-06-07
Attending: ORTHOPAEDIC SURGERY
Payer: MEDICAID

## 2024-06-07 VITALS — HEART RATE: 82 BPM | SYSTOLIC BLOOD PRESSURE: 109 MMHG | DIASTOLIC BLOOD PRESSURE: 68 MMHG

## 2024-06-07 DIAGNOSIS — I26.99 PULMONARY EMBOLISM, OTHER, UNSPECIFIED CHRONICITY, UNSPECIFIED WHETHER ACUTE COR PULMONALE PRESENT (HCC): ICD-10-CM

## 2024-06-07 DIAGNOSIS — S82.841A BIMALLEOLAR ANKLE FRACTURE, RIGHT, CLOSED, INITIAL ENCOUNTER: ICD-10-CM

## 2024-06-07 PROCEDURE — 97110 THERAPEUTIC EXERCISES: CPT | Performed by: PHYSICAL THERAPIST

## 2024-06-07 PROCEDURE — 99212 OFFICE O/P EST SF 10 MIN: CPT

## 2024-06-07 RX ORDER — MEGESTROL ACETATE 20 MG/1
20 TABLET ORAL DAILY
COMMUNITY

## 2024-06-07 NOTE — OP THERAPY DAILY TREATMENT
"  Outpatient Physical Therapy  DAILY TREATMENT     [facility] [place]  [address], [address]  [place] [address]  Phone:  [phone]  Fax:  [phone]    Date: 06/07/2024    Patient: Yani Moore  YOB: 1980  MRN: [id number]     Time Calculation    Start time: 0800  Stop time: 0845 Time Calculation (min): 45 minutes     Chief Complaint: Ankle Problem    Visit #: 9    SUBJECTIVE:  Please see NJ    OBJECTIVE:  Current objective measures: Please see NJ  Lower Extremity Functional Scale Percentage: 70       Therapeutic Exercises (CPT 01818):     1. Rec bike w/u, 8'    5. single leg balance, 2x to fatigue    8. body weight mini squat at mirror for visual feedback, 2x to fatigue    11. gastroc and soleus stretch c wedge, 1' ea    13. SL RDL, 2x10, discontinued because patient was fatigued from exercise session    14. SL heel raise, 2x to fatigue c contralat support to control discomfort.    15. SL hop, 2x to fatigue    16. squat jump, 2x to fatigue    17. jog, 2 laps    18. lateral shuffle, 2 laps    19. step up onto BOSU convex FWD, LAT, x1 to fatigue ea +1\" hold      Therapeutic Exercise Summary: Updated HEP: gastroc and soleus stretch, standing heel raise, tandem balance, single leg balance, ankle eversion and inversion c purple TB, sit to stand    Updated HEP (6/7:2024): gastroc and soleus stretch c wedge, single leg hopping, squat jump, SL RDL, jog    Therapeutic Treatments and Modalities:     1. Hot or Cold Pack Therapy (CPT 96895), 10' to R ankle CP    Time-based treatments/modalities:    Physical Therapy Timed Treatment Charges  Therapeutic exercise minutes (CPT 64043): 45 minutes    ASSESSMENT:   Response to treatment: Please see NJ    PLAN/RECOMMENDATIONS:   Plan for treatment: therapy treatment to continue next visit.  Planned interventions for next visit: continue with current treatment.         "

## 2024-06-07 NOTE — OP THERAPY PROGRESS SUMMARY
Outpatient Physical Therapy  PROGRESS SUMMARY NOTE      [facility]  [address], [address]  [place] [address]  Phone:  [phone]  Fax:  [phone]    Date of Visit: 06/07/2024    Patient: Yani Moore  YOB: 1980  MRN: [id number]     Referring Provider: Mitch Ambriz M.D.  [address] [place] [address]   Referring Diagnosis Pain in right ankle and joints of right foot [M25.571];Displaced bimalleolar fracture of right lower leg, initial encounter for closed fracture [S82.841A]     Visit Diagnoses     ICD-10-CM   1. Bimalleolar ankle fracture, right, closed, initial encounter  S82.841A       Rehab Potential: excellent    Progress Report Period: 4/24/2024    Functional Assessment Used  Lower Extremity Functional Scale Percentage: 70       Objective Findings and Assessment:   Patient progression towards goals: Yani reports that she is feeling much better overall. She was recently discharged from care by her surgeon.    She has tried some very brief jogging which was difficult but not painful for her. She also can't run. She still feels somewhat stiff and weak, especially when she tries to do higher level exercise. She wakes up feeling stiff.    She can walk much more now without discomfort. Up to about a mile.    She wants to make sure that she can run and jump a light but controlled level and do basic activity. She also wants to be able to confidently return to hiking.    Objective findings and assessment details: ASSESSMENT  Yani is demonstrating excellent overall improvement. She has gained significant strength in her ankle and is now ambulating normally without pain for at least a mile. Her greatest limitations at this point are flexibility in her triceps surae, and higher level function needed for return to light recreational sport participation. She has met several of her goals and has progressed in strengthening and post op time (>13  weeks) that it is appropriate for her to begin light jumping based activities like jogging in controlled environment. I updated her POC and HEP to include this and recommend another 4 weeks to ensure good outcome with discharge.    OBJECTIVE     Palpation      Right   Hypertonic lateral gastrocnemius, medial gastrocnemius and soleus.      Active Range of Motion   Left Ankle/Foot   Dorsiflexion (ke): 10 degrees   Plantar flexion: 45 degrees   Inversion: 30 degrees   Eversion: 35 degrees      Right Ankle/Foot   Dorsiflexion (ke): 0 degrees   Plantar flexion: 45 degrees   Inversion: 40 degrees   Eversion: 20 degrees     Strength:       Left Ankle/Foot   Dorsiflexion: 4+  Plantar flexion: 4+  Inversion: 4+  Eversion: 4+     Right Ankle  Dorsiflexion: 4  Plantar flexion: 3+  Inversion: 4+  Eversion: 4+        General Comments      Ankle/Foot Comments   Flexibility: moderate flexibility deficit to R gastroc and soleus, minimal to anterior tibialis      Goals:   Short Term Goals:   1. Patient will demonstrate independent regular performance of tailored home exercise program in order to facilitate recovery and promote self treatment and patient ownership of recovery -- MET --  2. Patient will demo normal flexibility to bilateral gastroc and soleus muscles in order to enable improved DF AROM needed for normal gait -- IN PROGRESS, moderate --  3. Patient will demo R ankle DF AROM >/= 10 deg needed for normal gait mechanics -- IN PROGRESS, -2 --  4. Patient will demo R ankle PF AROM >/= 50 deg needed for normal gait and stair navigation -- MET --     Short term goal time span:  2-4 weeks      Long Term Goals:    1. Patient will demo R ankle inversion / eversion strength >/= 4/5 pain free -- MET --  2. Patient will demo normal reciprocal gait out of boot (once allowed by MD) for at least 500 ft in order to indicate readiness to return to community ambulation distances. -- MET --  3. Patient will self report </= 35% disability  via LEFS functional assessment questionnaire (currently 61%) -- MET, 30% --  4. NEW GOAL (6/7/2024) Patient will demo normal reciprocal jog for at least 100 ft in order to indicate readiness to return to light recreational exercise participation without risk of reinjury        Long term goal time span:  6-8 weeks    Plan:   Planned therapy interventions:  Neuromuscular Re-education (CPT 83990), Therapeutic Exercise (CPT 19436) and Therapeutic Activities (CPT 54325)  Frequency:  2x week  Duration in weeks:  4      Referring provider co-signature:  I have reviewed this plan of care and my co-signature certifies the need for services.     Certification Period: 06/07/2024 to 07/05/24    Physician Signature: ________________________________ Date: ______________

## 2024-06-07 NOTE — PROGRESS NOTES
Target end date: 8/1/24 (3 months for now)     Indication: PE     Drug: Eliquis 5 mg BID     CHADsVASC = N/a    Health Status Since Last Assessment   Patient denies any new relevant medical problems, ED visits or hospitalizations   Patient denies any embolic events (stroke/tia/systemic embolism)    Adherence with DOAC Therapy   Pt has no missed doses in the average week    Bleeding Risk Assessment   Denies Epistaxis   Pt denies any excessive or unusual hematomas. Pt endorses heavier menses and cut on her hand that bled for < 5 min - CBC WNL. Pt denies any GI bleeds or hematemesis.  Pt denies any concerning daily headache or sub dural hematoma symptoms.   Pt denies any hematuria    Latest Hemoglobin: 14.5   Platelets: 337   ETOH overuse - Denies     Creatinine Clearance/Renal Function   Latest CrCl > 100 mL/min    Hepatic function   Latest LFTs WNL   Pt denies any history of liver dysfunction      Drug Interactions   ASA/other antiplatelets - None   NSAID - Avoids   Other drug interactions - No significant DDI noted   x Verified no anticonvulsant or azole therapy, education provided for future use.     Examination   Blood Pressure - Recorded in vitals   Symptomatic hypotension - Occasional orthostatic hypotension   Significant gait impairment/imbalance/high risk for falls? No obvious impairments.    Final Assessment and Recommendations:   Patient appears stable from the anticoagulation standpoint.     Benefits of continued DOAC therapy outweigh risks for this patient   Recommend pt continue with current DOAC therapy - Eliquis 5 mg BID   DOAC is affordable     Other Actions: cmp/ cbc hemogram ordered prior to next visit    Follow up:   Will follow up with patient 7 weeks for LOT.     Laron Ceron, PharmD, BCACP

## 2024-06-14 ENCOUNTER — APPOINTMENT (OUTPATIENT)
Dept: PHYSICAL THERAPY | Facility: REHABILITATION | Age: 44
End: 2024-06-14
Attending: ORTHOPAEDIC SURGERY
Payer: MEDICAID

## 2024-06-24 ENCOUNTER — PHYSICAL THERAPY (OUTPATIENT)
Dept: PHYSICAL THERAPY | Facility: REHABILITATION | Age: 44
End: 2024-06-24
Attending: ORTHOPAEDIC SURGERY
Payer: MEDICAID

## 2024-06-24 DIAGNOSIS — S82.841A BIMALLEOLAR ANKLE FRACTURE, RIGHT, CLOSED, INITIAL ENCOUNTER: ICD-10-CM

## 2024-06-24 PROCEDURE — 97110 THERAPEUTIC EXERCISES: CPT

## 2024-06-24 NOTE — OP THERAPY DAILY TREATMENT
"  Outpatient Physical Therapy  DAILY TREATMENT     [facility] [place]  [address] [place] [address], [address]  [facility] [place] [address]  Phone:  [phone]  Fax:  [phone]    Date: 06/24/2024    Patient: Yani Moore  YOB: 1980  MRN: [id number]     Time Calculation    Start time: 0245  Stop time: 0325 Time Calculation (min): 40 minutes         Chief Complaint: ankle pain   Visit #: 10    SUBJECTIVE:  Pt states she has been jogging a little and it feels ok. Pt states the squat jumps are feeling ok.     OBJECTIVE:        Therapeutic Exercises (CPT 21107):     1. Rec bike w/u, 8'    3. shuttle hopping  4 cords, 2 x to fatique    5. single leg balance, 2x to fatigue    9. 20# kettleball squats, 2x to fatigue    11. gastroc and soleus stretch c wedge, 1' ea    13. tandem walking    14. SL heel raise, 2x to fatigue c contralat support to control discomfort.    15. SL hop on shuttle, 2x to fatigue    16. squat jump, 2x to fatigue    17. jog, 2 laps    18. lateral shuffle, 2 laps    19. step up onto BOSU convex FWD, LAT, x1 to fatigue ea +1\" hold    20. reviewed HEP      Therapeutic Exercise Summary: Updated HEP: gastroc and soleus stretch, standing heel raise, tandem balance, single leg balance, ankle eversion and inversion c purple TB, sit to stand    Updated HEP (6/7:2024): gastroc and soleus stretch c wedge, single leg hopping, squat jump, SL RDL, jog    Therapeutic Treatments and Modalities:     1. Hot or Cold Pack Therapy (CPT 13715), 10' to R ankle CP    Time-based treatments/modalities:    Physical Therapy Timed Treatment Charges  Therapeutic exercise minutes (CPT 01589): 40 minutes          ASSESSMENT:   Response to treatment: pt tolerated plymometric well today. She will be DC'd to HEP soon.     PLAN/RECOMMENDATIONS:   Plan for treatment: therapy treatment to continue next visit.  Planned interventions for next visit: continue with current treatment, manual therapy (CPT 44102), " neuromuscular re-education (CPT 43330), and therapeutic exercise (CPT 95266).

## 2024-07-08 ENCOUNTER — PHYSICAL THERAPY (OUTPATIENT)
Dept: PHYSICAL THERAPY | Facility: REHABILITATION | Age: 44
End: 2024-07-08
Payer: MEDICAID

## 2024-07-08 DIAGNOSIS — S82.841A BIMALLEOLAR ANKLE FRACTURE, RIGHT, CLOSED, INITIAL ENCOUNTER: ICD-10-CM

## 2024-07-08 PROCEDURE — 97110 THERAPEUTIC EXERCISES: CPT | Performed by: PHYSICAL THERAPIST

## 2024-07-17 ENCOUNTER — HOSPITAL ENCOUNTER (OUTPATIENT)
Dept: LAB | Facility: MEDICAL CENTER | Age: 44
End: 2024-07-17
Payer: MEDICAID

## 2024-07-17 DIAGNOSIS — I26.99 PULMONARY EMBOLISM, OTHER, UNSPECIFIED CHRONICITY, UNSPECIFIED WHETHER ACUTE COR PULMONALE PRESENT (HCC): ICD-10-CM

## 2024-07-17 LAB
ALBUMIN SERPL BCP-MCNC: 4.6 G/DL (ref 3.2–4.9)
ALBUMIN/GLOB SERPL: 1.5 G/DL
ALP SERPL-CCNC: 76 U/L (ref 30–99)
ALT SERPL-CCNC: 27 U/L (ref 2–50)
ANION GAP SERPL CALC-SCNC: 16 MMOL/L (ref 7–16)
AST SERPL-CCNC: 14 U/L (ref 12–45)
BILIRUB SERPL-MCNC: 0.5 MG/DL (ref 0.1–1.5)
BUN SERPL-MCNC: 12 MG/DL (ref 8–22)
CALCIUM ALBUM COR SERPL-MCNC: 9.6 MG/DL (ref 8.5–10.5)
CALCIUM SERPL-MCNC: 10.1 MG/DL (ref 8.5–10.5)
CHLORIDE SERPL-SCNC: 106 MMOL/L (ref 96–112)
CO2 SERPL-SCNC: 19 MMOL/L (ref 20–33)
CREAT SERPL-MCNC: 0.87 MG/DL (ref 0.5–1.4)
ERYTHROCYTE [DISTWIDTH] IN BLOOD BY AUTOMATED COUNT: 42.6 FL (ref 35.9–50)
GFR SERPLBLD CREATININE-BSD FMLA CKD-EPI: 83 ML/MIN/1.73 M 2
GLOBULIN SER CALC-MCNC: 3.1 G/DL (ref 1.9–3.5)
GLUCOSE SERPL-MCNC: 92 MG/DL (ref 65–99)
HCT VFR BLD AUTO: 46.5 % (ref 37–47)
HGB BLD-MCNC: 14.7 G/DL (ref 12–16)
MCH RBC QN AUTO: 26.9 PG (ref 27–33)
MCHC RBC AUTO-ENTMCNC: 31.6 G/DL (ref 32.2–35.5)
MCV RBC AUTO: 85.2 FL (ref 81.4–97.8)
PLATELET # BLD AUTO: 364 K/UL (ref 164–446)
PMV BLD AUTO: 11.5 FL (ref 9–12.9)
POTASSIUM SERPL-SCNC: 4.1 MMOL/L (ref 3.6–5.5)
PROT SERPL-MCNC: 7.7 G/DL (ref 6–8.2)
RBC # BLD AUTO: 5.46 M/UL (ref 4.2–5.4)
SODIUM SERPL-SCNC: 141 MMOL/L (ref 135–145)
WBC # BLD AUTO: 6.6 K/UL (ref 4.8–10.8)

## 2024-07-17 PROCEDURE — 36415 COLL VENOUS BLD VENIPUNCTURE: CPT

## 2024-07-17 PROCEDURE — 85027 COMPLETE CBC AUTOMATED: CPT

## 2024-07-17 PROCEDURE — 80053 COMPREHEN METABOLIC PANEL: CPT

## 2024-07-22 NOTE — PROGRESS NOTES
VASCULAR MEDICINE CLINIC - INITIAL VISIT (ANTICOAGULATION)  07/26/24     Referred for length of therapy (LOT) determination of anticoagulation in context of acute venothromboembolic disease    Subjective    HPI:  Doing well on Eliquis.  She recently cut finger and noticed it took longer to stop bleeding.  Menstrual cycles are heavier than normal but not excessive.  No other concerns about bleeding.  Copay for Eliquis affordable.  No SOB or CP.  No swelling or pain to her extremities.  Fully ambulatory and resumed her usual activities since ankle surgery.  Scheduled for hysterectomy for uterine ca on 8/13/24 with Dr Bassett.  Pt reports that she cannot proceed with surgery unless she has another CT scan to confirm no PE.  No other concerns.    VTE disease / Anticoagulation:   Date of initiation of anticoagulation: 4/27/24  Current symptoms:  Denies current SOB, CP, leg swelling, edema, leg pain, cyanosis of digits, redness of extremities.  Current antithrombotic agent: Eliquis 5 mg BID  Complications: none, tolerating well, no bleeding or bruising   Adherence: reports complete, no missed doses      _____Pertinent VTE pmhx:   Date of Diagnosis: 4/27/24  Type of Venous thromboembolic disease (VTE): possible rt pulmonary artery PE  Preceding/presenting symptoms: She presented to the ER on 4/27/24 with rt sided chest tightness x 1 week and mild SOB.  Initial anticoag visit was 5/1/24. Dr Bloch recommended venous duplex of extremities to r/o DVT which was neg for upper and lower extremities obtained 6/4/24.  Antithrombotic therapy at time of VTE event: no  VTE tx course: Eliquis 10 mg BID x 7 days then 5 mg BID.  Any personal previous VTE hx? No  Any family VTE hx? No    _____UNPROVOKED VS PROVOKED:   Recent surgery ? Yes, Details: Had ORIF of right ankle 3/5/24. Discharged same day WBAT, in cam walker boot x 5 weeks.  Recent trauma ? Yes, Details: Had a syncopal episode in March causing her to fall and fracture her right  ankle  Smoker?  reports that she has quit smoking. She does not have any smokeless tobacco history on file.    Extended travel? No  Other periods of immobility? No  Other known or potential risk factors for VTE disease:  yes - uterine cancer diagnosed 3/15/24. Planning to have hysterectomy. Follows with Dr Bassett.  MEN:  Hx of cancer or abnormal cancer screenings: N\A  Hx of Testosterone therapy: N\A  WOMEN: Hx of cancer or abnormal cancer screenings: yes; recently diagnosed with uterine cancer. Hysterectomy scheduled 8/13/24.       Any estrogen, testosterone HRT, E2 birth control, tamoxifene, raloxifene: no       Hx of recurrent miscarriages: no, never been pregnant  Hypercoaguability work-up completed?  yes - done in ER. Neg for LA, protein c and s def, PTGM, FVL and ACLA    Patient Active Problem List   Diagnosis    Bimalleolar ankle fracture, right, closed, initial encounter    Pulmonary embolism (HCC)      Past Surgical History:   Procedure Laterality Date    PB REPAIR BOTH COLLAT ANKL LIGMT,PBIMRY Right 3/5/2024    Procedure: REPAIR, DELTOID LIGAMENT;  Surgeon: Mitch Ambriz M.D.;  Location: SURGERY [facility];  Service: Orthopedics    ORIF, ANKLE Right 3/5/2024    Procedure: RIGHT ANKLE BIMALLEOLAR OPEN REDUCTION INTERNAL FIXATION, SYNDESMOTIC;  Surgeon: Mitch Ambriz M.D.;  Location: SURGERY [facility];  Service: Orthopedics    HERNIA REPAIR      ingu      No family history on file.   Social History     Tobacco Use    Smoking status: Former   Vaping Use    Vaping status: Every Day    Substances: THC, CBD    Devices: Disposable   Substance Use Topics    Alcohol use: Yes     Comment: 1 x per week 2-3 drinks    Drug use: Yes     Types: Inhaled     Comment: canabis       DIET AND EXERCISE:  Weight Change:stable  Diet: common adult    Current Outpatient Medications on File Prior to Visit   Medication Sig Dispense Refill    megestrol (MEGACE) 20 MG Tab Take 20 mg by mouth every day.      apixaban (ELIQUIS)  "5mg Tab Take 1 Tablet by mouth 2 times a day. 60 Tablet 5    VRAYLAR 4.5 MG Cap Take 4.5 mg by mouth every evening.      busPIRone (BUSPAR) 10 MG Tab tablet Take 20 mg by mouth 2 times a day.      topiramate (TOPAMAX) 50 MG tablet Take 50 mg by mouth every day.       No current facility-administered medications on file prior to visit.     Allergies:  Serotonin, Sumatriptan, and Latex    ROS         Objective       Objective:     Vitals:    24 1110   BP: 125/76   Pulse: 78        Physical Exam     DATA REVIEW    No results found for: \"CHOLSTRLTOT\", \"LDL\", \"HDL\", \"TRIGLYCERIDE\"    Lab Results   Component Value Date/Time    SODIUM 141 2024 01:02 PM    POTASSIUM 4.1 2024 01:02 PM    CHLORIDE 106 2024 01:02 PM    CO2 19 (L) 2024 01:02 PM    GLUCOSE 92 2024 01:02 PM    BUN 12 2024 01:02 PM    CREATININE 0.87 2024 01:02 PM     Lab Results   Component Value Date/Time    ALKPHOSPHAT 76 2024 01:02 PM    ASTSGOT 14 2024 01:02 PM    ALTSGPT 27 2024 01:02 PM    TBILIRUBIN 0.5 2024 01:02 PM       No results found for: \"INR\"  No results found for: \"POCINR\"     Pertinent Cardiovascular Imagin/27/24 Ctpa:  1. Limited exam with poor opacification of the pulmonary arteries.  2. There is a possible small amount of right pulmonary artery embolism.    24 Mundo LE venous duplex:  No deep venous thrombosis in bilateral lower extremities.     24 [facility]undo UE venous duplex:  No superficial or deep venous thrombosis in bilateral upper extremities.     Hypercoaguability work-up completed?  YES  Hypercoag w/u (OK to check WHILE ON ANTICOAG)  Elevated D-dimer?  not tested  Factor V leiden?  negative  Factor II DNA analysis (prothrombin gene mutation)? negative  Beta 2-glycoprotein-I aB IgG, IgM?  not tested  Anticardiolipin antibodies?  negative  Hypercoag w/u (OK to check 2 WEEKS AFTER STOPPING ANTICOAG)  Protein C functional deficiency?  negative  Protein S " functional deficiency? negative  Antithrombin III functional deficiency?  not tested  Lupus anticoagulant?  negative      Medical Decision Making:  Today's Assessment / Status / Plan:     1. Single subsegmental pulmonary embolism without acute cor pulmonale (HCC)  CT-CTA CHEST PULMONARY ARTERY W/ RECONS           1) VTE disease: possible PE   Provoked? possibly provoked by malignancy and ortho injury/surgery  Thrombophilia/hypercoag evaluation:  had partial work up which was neg.  PLAN  - will pursue repeat CTA though we don't normally order, however, pt needs f/u scan prior to proceeding with upcoming hysterectomy  - antithrombotic plan as noted below   - counseled on signs and symptoms of acute VTE that require seeking prompt attention in the ED including shortness of breath, chest pain, pain with deep inhalation, acute leg swelling and/or pain in calf or leg   - elevate legs as much as possible, use compression stockings/socks if directed by your provider  - avoid hormonal therapies containing E2 or testosterone, raloxifene, tamoxifene, and other meds increasing risk for VTE   - avoid or limit sedentary periods  - continue complete avoidance of tobacco products  - if having any invasive procedure,please make sure the doctor knows of your history of blood clots and current anticoagulation status  - recommend f/u with PCP for age-appropriate cancer-screening due to risk of malig-associated VTE    ANTITHROMBOTIC THERAPY  Date of initiation: 4/27/24  HAS-BLED bleeding risk calc ([email or web address]): 0 pts, 0.9%, low risk   Factors to consider for indefinite OAC: None   Last CBC, BMP: reviewed above   Expected duration: reviewed standard of care as per Chest 2021 guidelines is 3-6 months minimum on full dose OAC.  After review of risks/benefits/alternatives, through shared decision-making, we will continue Eliquis 5 mg by mouth twice daily until she has her follow CTA. If neg, will plan to stop OAC as long as Dr Bassett in  agreement from an oncology perspective. If she stays on Eliquis, will plan to HOLD 2 days prior to surgery on 8/13/24.    PLAN:  - continue taking Eliquis 5 mg by mouth twice daily  - schedule CTA as soon as you can, as results needed to proceed with surgery 8/13/24. We will call you with the results  - stressed strict adherence to tx and avoid early termination due to increased risk for recurrent VTE  - Q6mo CBC, BMP (monitor CrCl) while on OAC   - avoid Aspirin and NSAIDs while anticoagulated   - limit or avoid sports or high-risk for head injury to reduce risk for intracranial bleeds  - advised to seek urgent eval for any GI bleeding, stroke-like sx, or minor bleeding >30min duration   - advised reversal agents are available for all agents     Studies to Be Obtained: CTPA  Labs to Be Obtained: cbc, cmp every 6 mo while taking doac    Follow up in: 2 weeks by phone to review CT results (scheduled for 8/8/24)  Pt declined face to face follow and requests to follow up by phone.    Larissa Hernandez APRN    Cc: Dr Bassett

## 2024-07-25 ENCOUNTER — ANTICOAGULATION VISIT (OUTPATIENT)
Dept: VASCULAR LAB | Facility: MEDICAL CENTER | Age: 44
End: 2024-07-25
Attending: INTERNAL MEDICINE
Payer: MEDICAID

## 2024-07-25 VITALS — SYSTOLIC BLOOD PRESSURE: 125 MMHG | HEART RATE: 78 BPM | DIASTOLIC BLOOD PRESSURE: 76 MMHG

## 2024-07-25 DIAGNOSIS — I26.93 SINGLE SUBSEGMENTAL PULMONARY EMBOLISM WITHOUT ACUTE COR PULMONALE (HCC): ICD-10-CM

## 2024-07-25 PROCEDURE — 99212 OFFICE O/P EST SF 10 MIN: CPT | Performed by: NURSE PRACTITIONER

## 2024-07-25 PROCEDURE — 99214 OFFICE O/P EST MOD 30 MIN: CPT | Performed by: NURSE PRACTITIONER

## 2024-07-25 PROCEDURE — 3078F DIAST BP <80 MM HG: CPT | Performed by: NURSE PRACTITIONER

## 2024-07-25 PROCEDURE — 3074F SYST BP LT 130 MM HG: CPT | Performed by: NURSE PRACTITIONER

## 2024-08-08 ENCOUNTER — HOSPITAL ENCOUNTER (OUTPATIENT)
Dept: RADIOLOGY | Facility: MEDICAL CENTER | Age: 44
End: 2024-08-08
Attending: NURSE PRACTITIONER
Payer: MEDICAID

## 2024-08-08 DIAGNOSIS — I26.93 SINGLE SUBSEGMENTAL PULMONARY EMBOLISM WITHOUT ACUTE COR PULMONALE (HCC): ICD-10-CM

## 2024-08-08 PROCEDURE — 700117 HCHG RX CONTRAST REV CODE 255: Mod: UD | Performed by: NURSE PRACTITIONER

## 2024-08-08 PROCEDURE — 71275 CT ANGIOGRAPHY CHEST: CPT

## 2024-08-08 RX ADMIN — IOHEXOL 100 ML: 350 INJECTION, SOLUTION INTRAVENOUS at 14:00

## 2024-08-12 ENCOUNTER — DOCUMENTATION (OUTPATIENT)
Dept: VASCULAR LAB | Facility: MEDICAL CENTER | Age: 44
End: 2024-08-12
Payer: MEDICAID

## 2024-08-12 NOTE — PROGRESS NOTES
Reviewed f/u ctpa result:    1. No CT evidence of pulmonary embolism.     2. No airspace opacity. No pleural effusion or pneumothorax.    Spoke with pt by phone. She recently saw Dr Bassett. He recommended she stay on Eliquis for at least another 6 months which she is agreeable to. Her hysterectomy surgery was rescheduled for 8/27/24 and Dr Bassett has requested she stop Eliquis 3 days prior. She will resume taking the day after surgery or when okay with Dr Bassett.    Pt declines anticoag follow up for now but will reach out if she is staying on Eliquis after 6 more months of treatment as she will need follow up if I am to continue to manage her refills. She verbalizes understanding.    Larissa OWENS

## 2025-04-30 ENCOUNTER — TELEPHONE (OUTPATIENT)
Dept: VASCULAR LAB | Facility: MEDICAL CENTER | Age: 45
End: 2025-04-30
Payer: MEDICAID

## 2025-04-30 NOTE — TELEPHONE ENCOUNTER
Pt is overdue for anticoag follow up - last seen 07/2024.  Per SK's note 08/12/24, Dr. Bassett may have taken over management.  LVM with pt to confirm so we can either d/c from clinic or re-establish care.    Tania Downing, PharmD

## 2025-06-03 ENCOUNTER — DOCUMENTATION (OUTPATIENT)
Dept: VASCULAR LAB | Facility: MEDICAL CENTER | Age: 45
End: 2025-06-03
Payer: MEDICAID

## 2025-06-03 NOTE — PROGRESS NOTES
Discharged from [facility] d/t nonadherence  Tania Downing, PharmD

## (undated) DEVICE — SLEEVE, VASO, THIGH, MED

## (undated) DEVICE — GLOVE BIOGEL PI INDICATOR SZ 7.0 SURGICAL PF LF - (50/BX 4BX/CA)

## (undated) DEVICE — SPLINT PLASTER 5 IN X 30 IN - (50EA/BX 6BX/CA)

## (undated) DEVICE — SUTURE GENERAL

## (undated) DEVICE — GLOVE SZ 8 BIOGEL PI MICRO - PF LF (50PR/BX)

## (undated) DEVICE — SET EXTENSION WITH 2 PORTS (48EA/CA) ***PART #2C8610 IS A SUBSTITUTE*****

## (undated) DEVICE — SUTURE 3-0 ETHILON FS-1 - (36/BX) 30 INCH

## (undated) DEVICE — TUBING CASSETTE CROSSFLOW INTEGRATED (10EA/CA)

## (undated) DEVICE — WRAP CO-FLEX 4IN X 5YD STERIL - SELF-ADHERENT (18/CA)

## (undated) DEVICE — GOWN WARMING STANDARD FLEX - (30/CA)

## (undated) DEVICE — SUTURE 3-0 MONOCRYL PLUS PS-1 - 27 INCH (36/BX)

## (undated) DEVICE — ELECTRODE DUAL RETURN W/ CORD - (50/PK)

## (undated) DEVICE — MASK AIRWAY FLEXIBLE SINGLE-USE SIZE 4 ADULTS (10EA/BX)

## (undated) DEVICE — COVER LIGHT HANDLE ALC PLUS DISP (18EA/BX)

## (undated) DEVICE — SODIUM CHL. IRRIGATION 0.9% 3000ML (4EA/CA 65CA/PF)

## (undated) DEVICE — SUTURE 0 VICRYL PLUS CT-2 - 8 X 18 INCH (12/BX)

## (undated) DEVICE — TOURNIQUET CUFF 34 X 4 ONE PORT DISP - STERILE (10/BX)

## (undated) DEVICE — PADDING CAST 6 IN STERILE - 6 X 4 YDS (24/CA)

## (undated) DEVICE — SHAVER 3.5 SM JT AGGRES.MEN. (5EA/BX)

## (undated) DEVICE — GLOVE BIOGEL PI INDICATOR SZ 8.5 SURGICAL PF LF - (50PR/BX 4BX/CA)

## (undated) DEVICE — BLADE SURGICAL #15 - (50/BX 3BX/CA)

## (undated) DEVICE — CANISTER SUCTION 3000ML MECHANICAL FILTER AUTO SHUTOFF MEDI-VAC NONSTERILE LF DISP  (40EA/CA)

## (undated) DEVICE — SUCTION INSTRUMENT YANKAUER BULBOUS TIP W/O VENT (50EA/CA)

## (undated) DEVICE — PAD LAP STERILE 18 X 18 - (5/PK 40PK/CA)

## (undated) DEVICE — GLOVE BIOGEL SZ 8 SURGICAL PF LTX - (50PR/BX 4BX/CA)

## (undated) DEVICE — Device

## (undated) DEVICE — KIT DRILL AND GUIDE FIBERTAK (5EA/BX)

## (undated) DEVICE — SODIUM CHL IRRIGATION 0.9% 1000ML (12EA/CA)

## (undated) DEVICE — NEEDLE W/FACET S TIP ECHOGENIC W/STIMULATION CABLE SONOPLEX II 21G X 4IN (10EA/BX)

## (undated) DEVICE — TUBING CLEARLINK DUO-VENT - C-FLO (48EA/CA)

## (undated) DEVICE — DRAPE 36X28IN RAD CARM BND BG - (25/CA) O

## (undated) DEVICE — PACK LOWER EXTREMITY - (2/CA)

## (undated) DEVICE — SENSOR OXIMETER ADULT SPO2 RD SET (20EA/BX)

## (undated) DEVICE — TUBING DAY USE W/CARTRIDGE (10EA/BX)

## (undated) DEVICE — SET LEADWIRE 5 LEAD BEDSIDE DISPOSABLE ECG (1SET OF 5/EA)

## (undated) DEVICE — CHLORAPREP 26 ML APPLICATOR - ORANGE TINT(25/CA)

## (undated) DEVICE — GLOVE BIOGEL INDICATOR SZ 8.5 SURGICAL PF LTX - (50/BX 4BX/CA)

## (undated) DEVICE — SUTURE 3-0 ETHILON PS-1 (36PK/BX)

## (undated) DEVICE — STOCKINET BIAS 6 IN STERILE - (20/CA)

## (undated) DEVICE — GLOVE SZ 7 BIOGEL PI MICRO - PF LF (50PR/BX 4BX/CA)

## (undated) DEVICE — DRESSING ABDOMINAL PAD STERILE 8 X 10" (360EA/CA)"

## (undated) DEVICE — DRAPE LARGE 3 QUARTER - (20/CA)

## (undated) DEVICE — DRESSING 3X3 ADAPTIC GAUZE - (50EA/CT)

## (undated) DEVICE — BIT DRILL DIA2.6MM SCALED FOR VARIAX 2 WRIST FUSION LOCKING PLATE SYSTEM

## (undated) DEVICE — GLOVE SZ 6.5 BIOGEL PI MICRO - PF LF (50PR/BX)

## (undated) DEVICE — LACTATED RINGERS INJ 1000 ML - (14EA/CA 60CA/PF)